# Patient Record
Sex: FEMALE | Race: WHITE | NOT HISPANIC OR LATINO | URBAN - METROPOLITAN AREA
[De-identification: names, ages, dates, MRNs, and addresses within clinical notes are randomized per-mention and may not be internally consistent; named-entity substitution may affect disease eponyms.]

---

## 2017-08-18 ENCOUNTER — OUTPATIENT (OUTPATIENT)
Dept: OUTPATIENT SERVICES | Facility: HOSPITAL | Age: 57
LOS: 1 days | Discharge: HOME | End: 2017-08-18
Payer: COMMERCIAL

## 2017-08-18 DIAGNOSIS — M06.9 RHEUMATOID ARTHRITIS, UNSPECIFIED: ICD-10-CM

## 2017-08-18 PROCEDURE — 71020: CPT | Mod: 26

## 2017-08-18 PROCEDURE — 73130 X-RAY EXAM OF HAND: CPT | Mod: 26,50

## 2017-08-18 PROCEDURE — 73630 X-RAY EXAM OF FOOT: CPT | Mod: 26,50

## 2017-09-08 ENCOUNTER — APPOINTMENT (OUTPATIENT)
Dept: RHEUMATOLOGY | Facility: CLINIC | Age: 57
End: 2017-09-08

## 2017-10-07 ENCOUNTER — OUTPATIENT (OUTPATIENT)
Dept: OUTPATIENT SERVICES | Facility: HOSPITAL | Age: 57
LOS: 1 days | Discharge: HOME | End: 2017-10-07

## 2017-10-07 DIAGNOSIS — M06.9 RHEUMATOID ARTHRITIS, UNSPECIFIED: ICD-10-CM

## 2017-10-13 ENCOUNTER — APPOINTMENT (OUTPATIENT)
Dept: OBGYN | Facility: CLINIC | Age: 57
End: 2017-10-13

## 2017-11-17 ENCOUNTER — APPOINTMENT (OUTPATIENT)
Dept: OBGYN | Facility: CLINIC | Age: 57
End: 2017-11-17
Payer: COMMERCIAL

## 2017-11-17 VITALS
BODY MASS INDEX: 20.49 KG/M2 | HEIGHT: 66.5 IN | DIASTOLIC BLOOD PRESSURE: 80 MMHG | TEMPERATURE: 97.9 F | WEIGHT: 129 LBS | SYSTOLIC BLOOD PRESSURE: 124 MMHG

## 2017-11-17 DIAGNOSIS — N95.1 MENOPAUSAL AND FEMALE CLIMACTERIC STATES: ICD-10-CM

## 2017-11-17 PROCEDURE — 99396 PREV VISIT EST AGE 40-64: CPT

## 2017-11-17 PROCEDURE — 82270 OCCULT BLOOD FECES: CPT

## 2017-11-19 RX ORDER — CELECOXIB 200 MG/1
200 CAPSULE ORAL
Qty: 60 | Refills: 0 | Status: ACTIVE | COMMUNITY
Start: 2017-01-31

## 2017-12-15 ENCOUNTER — RESULT REVIEW (OUTPATIENT)
Age: 57
End: 2017-12-15

## 2018-01-03 LAB — CYTOLOGY CVX/VAG DOC THIN PREP: NORMAL

## 2018-09-02 ENCOUNTER — EMERGENCY (EMERGENCY)
Facility: HOSPITAL | Age: 58
LOS: 1 days | Discharge: HOME | End: 2018-09-02
Attending: EMERGENCY MEDICINE
Payer: COMMERCIAL

## 2018-09-02 VITALS
SYSTOLIC BLOOD PRESSURE: 147 MMHG | TEMPERATURE: 98 F | RESPIRATION RATE: 18 BRPM | DIASTOLIC BLOOD PRESSURE: 97 MMHG | WEIGHT: 125 LBS | HEIGHT: 66 IN | OXYGEN SATURATION: 98 % | HEART RATE: 87 BPM

## 2018-09-02 PROCEDURE — 99283 EMERGENCY DEPT VISIT LOW MDM: CPT

## 2018-09-02 PROCEDURE — 73502 X-RAY EXAM HIP UNI 2-3 VIEWS: CPT | Mod: 26,RT

## 2018-09-02 PROCEDURE — 73502 X-RAY EXAM HIP UNI 2-3 VIEWS: CPT

## 2018-09-02 PROCEDURE — 73564 X-RAY EXAM KNEE 4 OR MORE: CPT | Mod: 26,RT

## 2018-09-02 PROCEDURE — 99284 EMERGENCY DEPT VISIT MOD MDM: CPT

## 2018-09-02 PROCEDURE — 73564 X-RAY EXAM KNEE 4 OR MORE: CPT

## 2018-09-02 RX ORDER — IBUPROFEN 200 MG
600 TABLET ORAL ONCE
Qty: 0 | Refills: 0 | Status: DISCONTINUED | OUTPATIENT
Start: 2018-09-02 | End: 2018-09-07
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
## 2018-09-02 NOTE — ED STATDOCS - PHYSICAL EXAMINATION
RLE: no apparent bony or muscular deformity. no apparent swelling. 2+ dp/pt pulses. sensation intact./ no calf tenderness. + TTP over lateral and medial aspects of the patella. no crepitus. + Flexion and extension of the knee. +TTP over the posterior thigh. no muscular defomity palpable. + Flexion and extension of the hip leg length equal. no crepitus at the hip
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
## 2018-09-02 NOTE — ED STATDOCS - ATTENDING CONTRIBUTION TO CARE
I, Leonardo Arevalo, performed bedside interview with this patient regarding history of present illness, review of symptoms and relevant past medical, social and family history.  I completed an independent physical examination.  I have communicated the patient’s plan of care and disposition with the ACP.

## 2019-01-11 ENCOUNTER — APPOINTMENT (OUTPATIENT)
Dept: OBGYN | Facility: CLINIC | Age: 59
End: 2019-01-11

## 2019-12-05 ENCOUNTER — APPOINTMENT (OUTPATIENT)
Dept: OBGYN | Facility: CLINIC | Age: 59
End: 2019-12-05

## 2019-12-16 ENCOUNTER — APPOINTMENT (OUTPATIENT)
Dept: OBGYN | Facility: CLINIC | Age: 59
End: 2019-12-16

## 2020-01-09 ENCOUNTER — EMERGENCY (EMERGENCY)
Facility: HOSPITAL | Age: 60
LOS: 0 days | Discharge: HOME | End: 2020-01-09
Attending: EMERGENCY MEDICINE
Payer: COMMERCIAL

## 2020-01-09 VITALS — WEIGHT: 134.92 LBS | HEIGHT: 67 IN

## 2020-01-09 VITALS — SYSTOLIC BLOOD PRESSURE: 167 MMHG | HEART RATE: 88 BPM | DIASTOLIC BLOOD PRESSURE: 93 MMHG
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
## 2020-01-09 DIAGNOSIS — R51 HEADACHE: ICD-10-CM
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
## 2020-01-09 DIAGNOSIS — S09.90XA UNSPECIFIED INJURY OF HEAD, INITIAL ENCOUNTER: ICD-10-CM

## 2020-01-09 DIAGNOSIS — S01.01XA LACERATION WITHOUT FOREIGN BODY OF SCALP, INITIAL ENCOUNTER: ICD-10-CM
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
## 2020-01-09 PROCEDURE — 99284 EMERGENCY DEPT VISIT MOD MDM: CPT | Mod: 25

## 2020-01-09 PROCEDURE — 72125 CT NECK SPINE W/O DYE: CPT

## 2020-01-09 PROCEDURE — 12001 RPR S/N/AX/GEN/TRNK 2.5CM/<: CPT

## 2020-01-09 PROCEDURE — 72125 CT NECK SPINE W/O DYE: CPT | Mod: 26

## 2020-01-09 PROCEDURE — 70450 CT HEAD/BRAIN W/O DYE: CPT | Mod: 26

## 2020-01-09 PROCEDURE — 99284 EMERGENCY DEPT VISIT MOD MDM: CPT

## 2020-01-09 PROCEDURE — 70450 CT HEAD/BRAIN W/O DYE: CPT

## 2020-01-09 RX ORDER — CEPHALEXIN 500 MG
1 CAPSULE ORAL
Qty: 20 | Refills: 0
Start: 2020-01-09 | End: 2020-01-13

## 2020-01-09 NOTE — ED STATDOCS - PROGRESS NOTE DETAILS
patient seen and evaluated.  well appearing.  CTs negative.  Laceration almost 24 hours old, after irrigation to the area there is an appreciable gape, 1 staple appled to better approximate.  wound care reviewed with the patient.  She will follow up pmd -Martha Vidal PA-C

## 2020-01-09 NOTE — ED STATDOCS - OBJECTIVE STATEMENT
60 y/o female presents to ED c/o headache s/o falling down the stairs last night. +Laceration to back of head, denies LOC. Pt visited an urgent care and was told to come to ED for a CT of head and neck. Pt had active bleeding from back of head after fall. Fall took place around 8:30 PM last night.

## 2020-01-09 NOTE — ED STATDOCS - SKIN, MLM
skin normal color for race, warm, dry and intact. +2 cm laceration vertical orientation left occiput, hemostatic, no foreign body.

## 2020-01-09 NOTE — ED STATDOCS - PATIENT PORTAL LINK FT
You can access the FollowMyHealth Patient Portal offered by [facility] by registering at the following website: [email or web address]. By joining [facility]’s FollowMyHealth portal, you will also be able to view your health information using other applications (apps) compatible with our system.

## 2020-01-09 NOTE — ED ADULT NURSE NOTE - CHIEF COMPLAINT QUOTE
Pt reports falling down the stairs last night and experiencing bleeding last night and headache this am.  Intake MD to eval with no neuro alert at this time.  Denies LOC, dizziness, or blood thinners.

## 2020-01-09 NOTE — ED ADULT TRIAGE NOTE - CHIEF COMPLAINT QUOTE
Pt reports falling down the stairs last night and experiencing bleeding and headache.  Intake MD to eval for neuro alert.  Denies LOC, dizziness, or blood thinners. Pt reports falling down the stairs last night and experiencing bleeding last night and headache this am.  Intake MD to eval with no neuro alert at this time.  Denies LOC, dizziness, or blood thinners.

## 2020-01-09 NOTE — ED STATDOCS - NSFOLLOWUPINSTRUCTIONS_ED_ALL_ED_FT
Staple should be removed in 7 days.  seek medical attention with signs of infection including redness, pain swelling, pus, fever    Laceration    WHAT YOU NEED TO KNOW:    A laceration is an injury to the skin and the soft tissue underneath it. Lacerations happen when you are cut or hit by something. They can happen anywhere on the body.     DISCHARGE INSTRUCTIONS:    Return to the emergency department if:     You have heavy bleeding or bleeding that does not stop after 10 minutes of holding firm, direct pressure over the wound.       Your wound opens up.     Contact your healthcare provider if:     You have a fever or chills.       Your laceration is red, warm, or swollen.      You have red streaks on your skin coming from your wound.      You have white or yellow drainage from the wound that smells bad.      You have pain that gets worse, even after treatment.       You have questions or concerns about your condition or care.     Medicines:     Prescription pain medicine may be given. Ask how to take this medicine safely.       Antibiotics help treat or prevent a bacterial infection.       Take your medicine as directed. Contact your healthcare provider if you think your medicine is not helping or if you have side effects. Tell him or her if you are allergic to any medicine. Keep a list of the medicines, vitamins, and herbs you take. Include the amounts, and when and why you take them. Bring the list or the pill bottles to follow-up visits. Carry your medicine list with you in case of an emergency.    Care for your wound as directed:     Do not get your wound wet until your healthcare provider says it is okay. Do not soak your wound in water. Do not go swimming until your healthcare provider says it is okay. Carefully wash the wound with soap and water. Gently pat the area dry or allow it to air dry.       Change your bandages when they get wet, dirty, or after washing. Apply new, clean bandages as directed. Do not apply elastic bandages or tape too tight. Do not put powders or lotions over your incision.       Apply antibiotic ointment as directed. Your healthcare provider may give you antibiotic ointment to put over your wound if you have stitches. If you have strips of tape over your incision, let them dry up and fall off on their own. If they do not fall off within 14 days, gently remove them. If you have glue over your wound, do not remove or pick at it. If your glue comes off, do not replace it with glue that you have at home.       Check your wound every day for signs of infection such as swelling, redness, or pus.     Self-care:     Apply ice on your wound for 15 to 20 minutes every hour or as directed. Use an ice pack, or put crushed ice in a plastic bag. Cover it with a towel. Ice helps prevent tissue damage and decreases swelling and pain.      Use a splint as directed. A splint will decrease movement and stress on your wound. It may help it heal faster. A splint may be used for lacerations over joints or areas of your body that bend. Ask your healthcare provider how to apply and remove a splint.       Decrease scarring of your wound by applying ointments as directed. Do not apply ointments until your healthcare provider says it is okay. You may need to wait until your wound is healed. Ask which ointment to buy and how often to use it. After your wound is healed, use sunscreen over the area when you are out in the sun. You should do this for at least 6 months to 1 year after your injury.     Follow up with your healthcare provider as directed: You may need to follow up in 24 to 48 hours to have your wound checked for infection. You will need to return in 3 to 14 days if you have stitches or staples so they can be removed. Care for your wound as directed to prevent infection and help it heal. Write down your questions so you remember to ask them during your visits.

## 2020-01-18 ENCOUNTER — EMERGENCY (EMERGENCY)
Facility: HOSPITAL | Age: 60
LOS: 0 days | Discharge: HOME | End: 2020-01-18
Attending: EMERGENCY MEDICINE
Payer: COMMERCIAL

## 2020-01-18 VITALS
HEART RATE: 87 BPM | DIASTOLIC BLOOD PRESSURE: 102 MMHG | OXYGEN SATURATION: 98 % | RESPIRATION RATE: 16 BRPM | TEMPERATURE: 99 F | SYSTOLIC BLOOD PRESSURE: 167 MMHG

## 2020-01-18 VITALS — WEIGHT: 134.92 LBS | HEIGHT: 67 IN

## 2020-01-18 DIAGNOSIS — S01.01XD LACERATION WITHOUT FOREIGN BODY OF SCALP, SUBSEQUENT ENCOUNTER: ICD-10-CM

## 2020-01-18 DIAGNOSIS — I10 ESSENTIAL (PRIMARY) HYPERTENSION: ICD-10-CM
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
## 2020-01-18 PROCEDURE — G0463: CPT

## 2020-01-18 NOTE — ED STATDOCS - CLINICAL SUMMARY MEDICAL DECISION MAKING FREE TEXT BOX
59 yr old F presents to the ED for suture removal 59 yr old F presents to the ED for suture removal        Staple removed.  HTN in ED x 2 visits.  Attending Rx medications.  Yajaira Nunez PA-C 59 yr old F presents to the ED for suture removal and hypertension on 2 visits to ed.  will remove staple and start on low dose bp med, reduction in salt intake discussed and f/u with pcp asap.        Staple removed.  HTN in ED x 2 visits.  Attending Rx medications.  Yajaira Nunez PA-C

## 2020-01-18 NOTE — ED STATDOCS - ATTENDING CONTRIBUTION TO CARE
I, Inés Weir MD, personally saw the patient with ACP.  I have personally performed a face to face diagnostic evaluation on this patient.  I have reviewed the ACP note and agree with the history, exam, and plan of care, except as noted.

## 2020-01-18 NOTE — ED STATDOCS - PROGRESS NOTE DETAILS
+1 staple to superior occiput.  For removal.  Yajaira Nunez PA-C Staple removed.  HTN in ED x 2 visits.  Attending Rx medications.  Yajaira Nunez PA-C

## 2020-01-18 NOTE — ED STATDOCS - NSFOLLOWUPCLINICS_GEN_ALL_ED_FT
[facility]  Family Medicine  [address] [place] [address]  Phone: [phone]  Fax:   Follow Up Time:

## 2020-01-18 NOTE — ED STATDOCS - PATIENT PORTAL LINK FT
You can access the FollowMyHealth Patient Portal offered by [facility] by registering at the following website: [email or web address]. By joining [facility]’s FollowMyHealth portal, you will also be able to view your health information using other applications (apps) compatible with our system.

## 2020-01-18 NOTE — ED STATDOCS - OBJECTIVE STATEMENT
60 y/o F with no PMHx presenting to the ED for suture removal. Patient reports that she fell down the stairs x1 week ago when she hit the back of her head, and presents back to the ED for staple removal. Notes that she has being having some neck pain. Denies any CP, SOB, fever or chills. Patient has no further complaints at this time.

## 2020-01-18 NOTE — ED STATDOCS - NSFOLLOWUPINSTRUCTIONS_ED_ALL_ED_FT
Stitches Removal    AMBULATORY CARE:    What you need to know about stitches removal: Stitches are usually removed within 14 days, depending on the location of the wound. Your healthcare provider will tell you when to return to have your stitches removed. Your provider will use sterile forceps or tweezers to  the knot of each stitch. He or she will cut the stitch with scissors and pull the stitch out. You may feel a slight tug as the stitch comes out.    Seek care immediately if:     Your wound splits open or is starting to come apart.      You suddenly cannot move your injured joint.      You have sudden numbness around your wound.      You see red streaks coming from your wound.    Contact your healthcare provider if:     You have a fever and chills.      Your wound is red, warm, swollen, or leaking pus.      There is a bad smell coming from your wound.      You have increased pain in the wound area.      You have questions or concerns about your condition or care.    Care for the area after the stitches are removed:     Do not pull medical tape off. Your provider may place small strips of medical tape across your wound after the stitches have been removed. These strips will peel and fall of on their own. Do not pull them off.      Clean the area as directed. Carefully wash the area with soap and water. Pat the area dry with a clean towel. Check the area for signs of infection, such as redness, swelling, or pus. Also check that the wound is not coming apart.      Protect your wound. Your wound can swell, bleed, or split open if it is stretched or bumped. You may need to wear a bandage that supports your wound until it is completely healed.      Care for a scar. You may have a scar after the stitches are removed. Use sunblock if the area is exposed to the sun. Apply it every day after the stitches are removed. This will help prevent skin discoloration. Talk to your healthcare provider about medicines you can use to make the scar less visible. Some medicines are available without a prescription.    Follow up with your healthcare provider as directed: You may need to return in 3 to 5 days if the stitches are on your face. Stitches on your scalp need to be removed after 7 to 14 days. Stitches over joints may remain in place up to 14 days. Write down your questions so you remember to ask them during your visits.    Hypertension    WHAT YOU NEED TO KNOW:    Hypertension is high blood pressure. Your blood pressure is the force of your blood moving against the walls of your arteries. Hypertension causes your blood pressure to get so high that your heart has to work much harder than normal. This can damage your heart. The cause of hypertension may not be known. This is called essential or primary hypertension. Hypertension caused by another medical condition, such as kidney disease, is called secondary hypertension.    DISCHARGE INSTRUCTIONS:    Call 911 for any of the following:     You have chest pain.      You have any of the following signs of a heart attack:   Squeezing, pressure, or pain in your chest       and any of the following:   Discomfort or pain in your back, neck, jaw, stomach, or arm       Shortness of breath      Nausea or vomiting      Lightheadedness or a sudden cold sweat      You become confused or have difficulty speaking.      You suddenly feel lightheaded or have trouble breathing.    Return to the emergency department if:     You have a severe headache or vision loss.      You have weakness in an arm or leg.     Contact your healthcare provider if:     You feel faint, dizzy, confused, or drowsy.       You have been taking your blood pressure medicine but your pressure is higher than your provider says it should be.      You have questions or concerns about your condition or care.    Medicines: You may need any of the following:     Antihypertensives may be used to help lower your blood pressure. Several kinds of medicines are available. Your healthcare provider will choose medicines based on the kind of hypertension you have. You may need more than one type of medicine. Take the medicine exactly as directed.       Diuretics help decrease extra fluid that collects in your body. This will help lower your blood pressure. You may urinate more often while you take this medicine.      Cholesterol medicine helps lower your cholesterol level. A low cholesterol level helps prevent heart disease and makes it easier to control your blood pressure.       Take your medicine as directed. Contact your healthcare provider if you think your medicine is not helping or if you have side effects. Tell him or her if you are allergic to any medicine. Keep a list of the medicines, vitamins, and herbs you take. Include the amounts, and when and why you take them. Bring the list or the pill bottles to follow-up visits. Carry your medicine list with you in case of an emergency.    Follow up with your healthcare provider as directed: You will need to return to have your blood pressure checked and to have other lab tests done. Write down your questions so you remember to ask them during your visits.     Stages of hypertension: Blood Pressure Readings         Normal blood pressure is 119/79 or lower. Your healthcare provider may only check your blood pressure each year if it stays at a normal level.      Elevated blood pressure is 120/79 to 129/79. This is sometimes called prehypertension. Your healthcare provider may suggest lifestyle changes to help lower your blood pressure to a normal level. He or she may then check it again in 3 to 6 months.      Stage 1 hypertension is 130/80 to 139/89. Your provider may recommend lifestyle changes, medication, and checks every 3 to 6 months until your blood pressure is controlled.      Stage 2 hypertension is 140/90 or higher. Your provider will recommend lifestyle changes and have you take 2 kinds of hypertension medicines. You will also need to have your blood pressure checked monthly until it is controlled.    Manage hypertension:     Check your blood pressure at home. Avoid smoking, caffeine, and exercise at least 30 minutes before checking your blood pressure. Sit and rest for 5 minutes before you take your blood pressure. Extend your arm and support it on a flat surface. Your arm should be at the same level as your heart. Follow the directions that came with your blood pressure monitor. Check your blood pressure 2 times, 1 minute apart, before you take your medicine in the morning. Also check your blood pressure before your evening meal. Keep a record of your readings and bring it to your follow-up visits. Ask your healthcare provider what your blood pressure should be. How to take a Blood Pressure           Manage any other health conditions you have. Health conditions such as diabetes can increase your risk for hypertension. Follow your healthcare provider's instructions and take all your medicines as directed.       Ask about all medicines. Certain medicines can increase your blood pressure. Examples include oral birth control pills, decongestants, herbal supplements, and NSAIDs, such as ibuprofen. Your healthcare provider can tell you which medicines are safe for you to take. This includes prescription and over-the-counter medicines.    Lifestyle changes you can make to manage hypertension:     Limit sodium (salt) as directed. Too much sodium can affect your fluid balance. Check labels to find low-sodium or no-salt-added foods. Some low-sodium foods use potassium salts for flavor. Too much potassium can also cause health problems. Your healthcare provider will tell you how much sodium and potassium are safe for you to have in a day. He or she may recommend that you limit sodium to 2,300 mg a day.           Follow the meal plan recommended by your healthcare provider. A dietitian or your provider can give you more information on low-sodium plans or the DASH (Dietary Approaches to Stop Hypertension) eating plan. The DASH plan is low in sodium, unhealthy fats, and total fat. It is high in potassium, calcium, and fiber.            Exercise to maintain a healthy weight. Exercise at least 30 minutes per day, on most days of the week. This will help decrease your blood pressure. Ask your healthcare provider about the best exercise plan for you. Walking for Exercise           Decrease stress. This may help lower your blood pressure. Learn ways to relax, such as deep breathing or listening to music.      Limit alcohol as directed. Alcohol can increase your blood pressure. A drink of alcohol is 12 ounces of beer, 5 ounces of wine, or 1½ ounces of liquor.      Do not smoke. Nicotine and other chemicals in cigarettes and cigars can increase your blood pressure and also cause lung damage. Ask your healthcare provider for information if you currently smoke and need help to quit. E-cigarettes or smokeless tobacco still contain nicotine. Talk to your healthcare provider before you use these products. FOLLOW UP WITH YOUR PCP OR [facility] WITHIN 1 WEEK    Stitches Removal    AMBULATORY CARE:    What you need to know about stitches removal: Stitches are usually removed within 14 days, depending on the location of the wound. Your healthcare provider will tell you when to return to have your stitches removed. Your provider will use sterile forceps or tweezers to  the knot of each stitch. He or she will cut the stitch with scissors and pull the stitch out. You may feel a slight tug as the stitch comes out.    Seek care immediately if:     Your wound splits open or is starting to come apart.      You suddenly cannot move your injured joint.      You have sudden numbness around your wound.      You see red streaks coming from your wound.    Contact your healthcare provider if:     You have a fever and chills.      Your wound is red, warm, swollen, or leaking pus.      There is a bad smell coming from your wound.      You have increased pain in the wound area.      You have questions or concerns about your condition or care.    Care for the area after the stitches are removed:     Do not pull medical tape off. Your provider may place small strips of medical tape across your wound after the stitches have been removed. These strips will peel and fall of on their own. Do not pull them off.      Clean the area as directed. Carefully wash the area with soap and water. Pat the area dry with a clean towel. Check the area for signs of infection, such as redness, swelling, or pus. Also check that the wound is not coming apart.      Protect your wound. Your wound can swell, bleed, or split open if it is stretched or bumped. You may need to wear a bandage that supports your wound until it is completely healed.      Care for a scar. You may have a scar after the stitches are removed. Use sunblock if the area is exposed to the sun. Apply it every day after the stitches are removed. This will help prevent skin discoloration. Talk to your healthcare provider about medicines you can use to make the scar less visible. Some medicines are available without a prescription.    Follow up with your healthcare provider as directed: You may need to return in 3 to 5 days if the stitches are on your face. Stitches on your scalp need to be removed after 7 to 14 days. Stitches over joints may remain in place up to 14 days. Write down your questions so you remember to ask them during your visits.    Hypertension    WHAT YOU NEED TO KNOW:    Hypertension is high blood pressure. Your blood pressure is the force of your blood moving against the walls of your arteries. Hypertension causes your blood pressure to get so high that your heart has to work much harder than normal. This can damage your heart. The cause of hypertension may not be known. This is called essential or primary hypertension. Hypertension caused by another medical condition, such as kidney disease, is called secondary hypertension.    DISCHARGE INSTRUCTIONS:    Call 911 for any of the following:     You have chest pain.      You have any of the following signs of a heart attack:   Squeezing, pressure, or pain in your chest       and any of the following:   Discomfort or pain in your back, neck, jaw, stomach, or arm       Shortness of breath      Nausea or vomiting      Lightheadedness or a sudden cold sweat      You become confused or have difficulty speaking.      You suddenly feel lightheaded or have trouble breathing.    Return to the emergency department if:     You have a severe headache or vision loss.      You have weakness in an arm or leg.     Contact your healthcare provider if:     You feel faint, dizzy, confused, or drowsy.       You have been taking your blood pressure medicine but your pressure is higher than your provider says it should be.      You have questions or concerns about your condition or care.    Medicines: You may need any of the following:     Antihypertensives may be used to help lower your blood pressure. Several kinds of medicines are available. Your healthcare provider will choose medicines based on the kind of hypertension you have. You may need more than one type of medicine. Take the medicine exactly as directed.       Diuretics help decrease extra fluid that collects in your body. This will help lower your blood pressure. You may urinate more often while you take this medicine.      Cholesterol medicine helps lower your cholesterol level. A low cholesterol level helps prevent heart disease and makes it easier to control your blood pressure.       Take your medicine as directed. Contact your healthcare provider if you think your medicine is not helping or if you have side effects. Tell him or her if you are allergic to any medicine. Keep a list of the medicines, vitamins, and herbs you take. Include the amounts, and when and why you take them. Bring the list or the pill bottles to follow-up visits. Carry your medicine list with you in case of an emergency.    Follow up with your healthcare provider as directed: You will need to return to have your blood pressure checked and to have other lab tests done. Write down your questions so you remember to ask them during your visits.     Stages of hypertension: Blood Pressure Readings         Normal blood pressure is 119/79 or lower. Your healthcare provider may only check your blood pressure each year if it stays at a normal level.      Elevated blood pressure is 120/79 to 129/79. This is sometimes called prehypertension. Your healthcare provider may suggest lifestyle changes to help lower your blood pressure to a normal level. He or she may then check it again in 3 to 6 months.      Stage 1 hypertension is 130/80 to 139/89. Your provider may recommend lifestyle changes, medication, and checks every 3 to 6 months until your blood pressure is controlled.      Stage 2 hypertension is 140/90 or higher. Your provider will recommend lifestyle changes and have you take 2 kinds of hypertension medicines. You will also need to have your blood pressure checked monthly until it is controlled.    Manage hypertension:     Check your blood pressure at home. Avoid smoking, caffeine, and exercise at least 30 minutes before checking your blood pressure. Sit and rest for 5 minutes before you take your blood pressure. Extend your arm and support it on a flat surface. Your arm should be at the same level as your heart. Follow the directions that came with your blood pressure monitor. Check your blood pressure 2 times, 1 minute apart, before you take your medicine in the morning. Also check your blood pressure before your evening meal. Keep a record of your readings and bring it to your follow-up visits. Ask your healthcare provider what your blood pressure should be. How to take a Blood Pressure           Manage any other health conditions you have. Health conditions such as diabetes can increase your risk for hypertension. Follow your healthcare provider's instructions and take all your medicines as directed.       Ask about all medicines. Certain medicines can increase your blood pressure. Examples include oral birth control pills, decongestants, herbal supplements, and NSAIDs, such as ibuprofen. Your healthcare provider can tell you which medicines are safe for you to take. This includes prescription and over-the-counter medicines.    Lifestyle changes you can make to manage hypertension:     Limit sodium (salt) as directed. Too much sodium can affect your fluid balance. Check labels to find low-sodium or no-salt-added foods. Some low-sodium foods use potassium salts for flavor. Too much potassium can also cause health problems. Your healthcare provider will tell you how much sodium and potassium are safe for you to have in a day. He or she may recommend that you limit sodium to 2,300 mg a day.           Follow the meal plan recommended by your healthcare provider. A dietitian or your provider can give you more information on low-sodium plans or the DASH (Dietary Approaches to Stop Hypertension) eating plan. The DASH plan is low in sodium, unhealthy fats, and total fat. It is high in potassium, calcium, and fiber.            Exercise to maintain a healthy weight. Exercise at least 30 minutes per day, on most days of the week. This will help decrease your blood pressure. Ask your healthcare provider about the best exercise plan for you. Walking for Exercise           Decrease stress. This may help lower your blood pressure. Learn ways to relax, such as deep breathing or listening to music.      Limit alcohol as directed. Alcohol can increase your blood pressure. A drink of alcohol is 12 ounces of beer, 5 ounces of wine, or 1½ ounces of liquor.      Do not smoke. Nicotine and other chemicals in cigarettes and cigars can increase your blood pressure and also cause lung damage. Ask your healthcare provider for information if you currently smoke and need help to quit. E-cigarettes or smokeless tobacco still contain nicotine. Talk to your healthcare provider before you use these products.

## 2020-01-19 PROBLEM — Z78.9 OTHER SPECIFIED HEALTH STATUS: Chronic | Status: ACTIVE | Noted: 2020-01-10

## 2020-03-17 ENCOUNTER — APPOINTMENT (OUTPATIENT)
Dept: OBGYN | Facility: CLINIC | Age: 60
End: 2020-03-17

## 2021-08-21 ENCOUNTER — APPOINTMENT (OUTPATIENT)
Dept: OBGYN | Facility: CLINIC | Age: 61
End: 2021-08-21

## 2022-10-20 ENCOUNTER — APPOINTMENT (OUTPATIENT)
Dept: OBGYN | Facility: CLINIC | Age: 62
End: 2022-10-20

## 2022-10-20 VITALS
DIASTOLIC BLOOD PRESSURE: 68 MMHG | SYSTOLIC BLOOD PRESSURE: 116 MMHG | BODY MASS INDEX: 23.35 KG/M2 | HEIGHT: 66.5 IN | WEIGHT: 147 LBS

## 2022-10-20 DIAGNOSIS — Z01.419 ENCOUNTER FOR GYNECOLOGICAL EXAMINATION (GENERAL) (ROUTINE) W/OUT ABNORMAL FINDINGS: ICD-10-CM

## 2022-10-20 LAB
BILIRUB UR QL STRIP: NORMAL
CLARITY UR: CLEAR
COLLECTION METHOD: NORMAL
GLUCOSE UR-MCNC: NORMAL
HCG UR QL: 0.2 EU/DL
HGB UR QL STRIP.AUTO: NORMAL
KETONES UR-MCNC: NORMAL
LEUKOCYTE ESTERASE UR QL STRIP: NORMAL
NITRITE UR QL STRIP: POSITIVE
PH UR STRIP: 8.5
PROT UR STRIP-MCNC: NORMAL
SP GR UR STRIP: 1.01

## 2022-10-20 PROCEDURE — 81003 URINALYSIS AUTO W/O SCOPE: CPT | Mod: QW

## 2022-10-20 PROCEDURE — 82270 OCCULT BLOOD FECES: CPT

## 2022-10-20 PROCEDURE — 99396 PREV VISIT EST AGE 40-64: CPT

## 2022-10-20 RX ORDER — PREGABALIN 150 MG/1
150 CAPSULE ORAL
Qty: 60 | Refills: 0 | Status: ACTIVE | COMMUNITY
Start: 2022-05-19

## 2022-10-20 RX ORDER — LIFITEGRAST 50 MG/ML
5 SOLUTION/ DROPS OPHTHALMIC
Qty: 60 | Refills: 0 | Status: ACTIVE | COMMUNITY
Start: 2022-04-14

## 2022-10-20 RX ORDER — IPRATROPIUM BROMIDE 42 UG/1
0.06 SPRAY NASAL
Qty: 15 | Refills: 0 | Status: ACTIVE | COMMUNITY
Start: 2022-09-14

## 2022-10-20 RX ORDER — TIZANIDINE 4 MG/1
4 TABLET ORAL
Qty: 90 | Refills: 0 | Status: ACTIVE | COMMUNITY
Start: 2022-02-17

## 2022-10-20 RX ORDER — BUDESONIDE 3 MG/1
3 CAPSULE, COATED PELLETS ORAL
Qty: 60 | Refills: 0 | Status: ACTIVE | COMMUNITY
Start: 2022-10-13

## 2022-10-20 RX ORDER — FLUNISOLIDE 0.25 MG/ML
25 MCG/ACT SOLUTION NASAL
Qty: 25 | Refills: 0 | Status: ACTIVE | COMMUNITY
Start: 2022-09-14

## 2022-10-20 RX ORDER — COLESTIPOL HYDROCHLORIDE 1 G/1
1 TABLET, FILM COATED ORAL
Qty: 360 | Refills: 0 | Status: ACTIVE | COMMUNITY
Start: 2022-04-13

## 2022-10-20 RX ORDER — BRIMONIDINE TARTRATE 5 MG/G
0.33 GEL TOPICAL
Qty: 90 | Refills: 0 | Status: ACTIVE | COMMUNITY
Start: 2022-04-22

## 2022-10-20 RX ORDER — BUDESONIDE 9 MG/1
9 TABLET, EXTENDED RELEASE ORAL
Qty: 30 | Refills: 0 | Status: ACTIVE | COMMUNITY
Start: 2022-03-10

## 2022-10-20 RX ORDER — GOLIMUMAB 50 MG/.5ML
50 INJECTION, SOLUTION SUBCUTANEOUS
Qty: 1 | Refills: 0 | Status: ACTIVE | COMMUNITY
Start: 2022-04-04

## 2022-10-20 RX ORDER — DICYCLOMINE HYDROCHLORIDE 20 MG/1
20 TABLET ORAL
Qty: 270 | Refills: 0 | Status: ACTIVE | COMMUNITY
Start: 2022-04-13

## 2022-10-20 RX ORDER — TOBRAMYCIN AND DEXAMETHASONE 3; 1 MG/ML; MG/ML
0.3-0.1 SUSPENSION/ DROPS OPHTHALMIC
Qty: 10 | Refills: 0 | Status: DISCONTINUED | COMMUNITY
Start: 2017-06-16 | End: 2022-10-20

## 2022-10-20 NOTE — PLAN
[FreeTextEntry1] : 61 YO FEMALE PRESENTS FOR ANNUAL GYN EXAMINATION. SELF BREAST EXAMINATION TAUGHT. MAMMOGRAM ORDERED. EXERCISE, CALCIUM AND VITAMIN D3 SUPPLEMENTATION DISCUSSED. BONE DENSITY STUDY AND INDICATIONS REVIEWED. COLONOSCOPY HISTORY REVIEWED AND DISCUSSED FOLLOWUP. CT ORDERED

## 2022-11-07 LAB
CYTOLOGY CVX/VAG DOC THIN PREP: NORMAL
HPV HIGH+LOW RISK DNA PNL CVX: NOT DETECTED

## 2022-11-15 ENCOUNTER — NON-APPOINTMENT (OUTPATIENT)
Age: 62
End: 2022-11-15

## 2022-11-15 DIAGNOSIS — R30.0 DYSURIA: ICD-10-CM

## 2022-11-15 RX ORDER — METHOTREXATE 2.5 MG/1
2.5 TABLET ORAL
Qty: 24 | Refills: 0 | Status: DISCONTINUED | COMMUNITY
Start: 2017-08-23 | End: 2022-11-15

## 2023-01-10 ENCOUNTER — NON-APPOINTMENT (OUTPATIENT)
Age: 63
End: 2023-01-10

## 2023-03-10 ENCOUNTER — APPOINTMENT (OUTPATIENT)
Dept: CARDIOLOGY | Facility: CLINIC | Age: 63
End: 2023-03-10
Payer: COMMERCIAL

## 2023-03-10 ENCOUNTER — NON-APPOINTMENT (OUTPATIENT)
Age: 63
End: 2023-03-10

## 2023-03-10 VITALS
BODY MASS INDEX: 23.63 KG/M2 | DIASTOLIC BLOOD PRESSURE: 64 MMHG | WEIGHT: 147 LBS | SYSTOLIC BLOOD PRESSURE: 118 MMHG | HEIGHT: 66 IN

## 2023-03-10 DIAGNOSIS — Z83.438 FAMILY HISTORY OF OTHER DISORDER OF LIPOPROTEIN METABOLISM AND OTHER LIPIDEMIA: ICD-10-CM

## 2023-03-10 DIAGNOSIS — E88.81 METABOLIC SYNDROME: ICD-10-CM

## 2023-03-10 DIAGNOSIS — Z82.49 FAMILY HISTORY OF ISCHEMIC HEART DISEASE AND OTHER DISEASES OF THE CIRCULATORY SYSTEM: ICD-10-CM

## 2023-03-10 PROCEDURE — 99204 OFFICE O/P NEW MOD 45 MIN: CPT | Mod: 25

## 2023-03-10 PROCEDURE — 93000 ELECTROCARDIOGRAM COMPLETE: CPT

## 2023-03-10 RX ORDER — NITROFURANTOIN (MONOHYDRATE/MACROCRYSTALS) 25; 75 MG/1; MG/1
100 CAPSULE ORAL TWICE DAILY
Qty: 14 | Refills: 0 | Status: DISCONTINUED | COMMUNITY
Start: 2022-10-27 | End: 2023-03-10

## 2023-03-10 RX ORDER — SUVOREXANT 15 MG/1
15 TABLET, FILM COATED ORAL
Qty: 10 | Refills: 0 | Status: DISCONTINUED | COMMUNITY
Start: 2017-10-23 | End: 2023-03-10

## 2023-03-10 RX ORDER — METFORMIN ER 500 MG 500 MG/1
500 TABLET ORAL
Qty: 270 | Refills: 0 | Status: DISCONTINUED | COMMUNITY
Start: 2022-05-16 | End: 2023-03-10

## 2023-03-10 RX ORDER — SULFAMETHOXAZOLE AND TRIMETHOPRIM 800; 160 MG/1; MG/1
800-160 TABLET ORAL TWICE DAILY
Qty: 6 | Refills: 0 | Status: DISCONTINUED | COMMUNITY
Start: 2022-11-15 | End: 2023-03-10

## 2023-03-10 RX ORDER — FOLIC ACID 1 MG/1
1 TABLET ORAL
Qty: 30 | Refills: 0 | Status: DISCONTINUED | COMMUNITY
Start: 2017-08-23 | End: 2023-03-10

## 2023-03-10 RX ORDER — METHYLPREDNISOLONE 4 MG/1
4 TABLET ORAL
Qty: 21 | Refills: 0 | Status: DISCONTINUED | COMMUNITY
Start: 2017-07-13 | End: 2023-03-10

## 2023-03-10 RX ORDER — CIPROFLOXACIN HYDROCHLORIDE 250 MG/1
250 TABLET, FILM COATED ORAL
Qty: 10 | Refills: 3 | Status: DISCONTINUED | COMMUNITY
Start: 2022-10-20 | End: 2023-03-10

## 2023-03-10 RX ORDER — AMLODIPINE BESYLATE 5 MG/1
5 TABLET ORAL DAILY
Qty: 90 | Refills: 3 | Status: ACTIVE | COMMUNITY
Start: 2023-03-10

## 2023-03-10 NOTE — PHYSICAL EXAM
[Well Developed] : well developed [Well Nourished] : well nourished [No Acute Distress] : no acute distress [Normal Conjunctiva] : normal conjunctiva [Normal Venous Pressure] : normal venous pressure [No Carotid Bruit] : no carotid bruit [Normal S1, S2] : normal S1, S2 [No Murmur] : no murmur [No Gallop] : no gallop [Clear Lung Fields] : clear lung fields [Good Air Entry] : good air entry [No Respiratory Distress] : no respiratory distress

## 2023-03-14 LAB
ALBUMIN SERPL ELPH-MCNC: 5 G/DL
ALP BLD-CCNC: 50 U/L
ALT SERPL-CCNC: 17 U/L
ANION GAP SERPL CALC-SCNC: 14 MMOL/L
AST SERPL-CCNC: 21 U/L
BILIRUB SERPL-MCNC: 0.3 MG/DL
BUN SERPL-MCNC: 16 MG/DL
CALCIUM SERPL-MCNC: 10.3 MG/DL
CHLORIDE SERPL-SCNC: 94 MMOL/L
CHOLEST SERPL-MCNC: 256 MG/DL
CO2 SERPL-SCNC: 28 MMOL/L
CREAT SERPL-MCNC: 0.78 MG/DL
EGFR: 86 ML/MIN/1.73M2
ESTIMATED AVERAGE GLUCOSE: 108 MG/DL
GLUCOSE SERPL-MCNC: 87 MG/DL
HBA1C MFR BLD HPLC: 5.4 %
HDLC SERPL-MCNC: 102 MG/DL
LDLC SERPL CALC-MCNC: 139 MG/DL
NONHDLC SERPL-MCNC: 153 MG/DL
POTASSIUM SERPL-SCNC: 3.7 MMOL/L
PROT SERPL-MCNC: 6.8 G/DL
SODIUM SERPL-SCNC: 137 MMOL/L
TRIGL SERPL-MCNC: 73 MG/DL

## 2023-03-17 NOTE — REVIEW OF SYSTEMS
[Weight Gain (___ Lbs)] : [unfilled] ~Ulb weight gain [Sinus Pressure] : sinus pressure [Abdominal Pain] : abdominal pain [Rash] : rash [Easy Bruising] : a tendency for easy bruising [Negative] : Psychiatric [FreeTextEntry3] : wears contacts [FreeTextEntry5] : feels improved with better BP control and heart racing resolved [FreeTextEntry7] : colitis under control [FreeTextEntry8] : post menopausal  [FreeTextEntry9] : can get left hand and joint swelling and right big toe joint flares [de-identified] : monthly facial rash and constant itching

## 2023-03-17 NOTE — HISTORY OF PRESENT ILLNESS
[FreeTextEntry1] : 62 year old woman with a history of autoimmune disorders with relapsing polychondritis and fibromyalgia, hypertension for years and a family history of hypertension and hyperlipidemia.  She had an episode of significant diarrhea and was diagnosed with lymphocytic colitis.  She is maintained on Simponi for her collagen vascular disorder and her most recent inflammatory markers are controlled.  Her primary care physician noted that her lipids which had always had elevated HDLs but her LDLs are rising.  She is concerned about her overall cardiovascular risk.  She had Covid a year ago when she developed chronic nasal congestion and is maintained on inhalers with improvement.

## 2023-03-17 NOTE — ADDENDUM
[FreeTextEntry1] : ADDENDUM 3/17/2023:  Labs reviewed with Corey - no diabetes and ASCVD risk 3.45% so continued lifestyle measures for cardiac risk reduction recommended.  She will follow up annually.  Await stress test results.

## 2023-03-28 ENCOUNTER — APPOINTMENT (OUTPATIENT)
Dept: CARDIOLOGY | Facility: CLINIC | Age: 63
End: 2023-03-28

## 2023-07-14 ENCOUNTER — NON-APPOINTMENT (OUTPATIENT)
Age: 63
End: 2023-07-14

## 2023-07-14 DIAGNOSIS — R60.0 LOCALIZED EDEMA: ICD-10-CM

## 2023-07-16 LAB
ALBUMIN SERPL ELPH-MCNC: 4.5 G/DL
ALP BLD-CCNC: 85 U/L
ALT SERPL-CCNC: 27 U/L
ANION GAP SERPL CALC-SCNC: 14 MMOL/L
AST SERPL-CCNC: 22 U/L
BILIRUB SERPL-MCNC: 0.2 MG/DL
BUN SERPL-MCNC: 17 MG/DL
CALCIUM SERPL-MCNC: 10.1 MG/DL
CHLORIDE SERPL-SCNC: 98 MMOL/L
CHOLEST SERPL-MCNC: 223 MG/DL
CO2 SERPL-SCNC: 28 MMOL/L
CREAT SERPL-MCNC: 0.8 MG/DL
DEPRECATED D DIMER PPP IA-ACNC: <150 NG/ML DDU
EGFR: 84 ML/MIN/1.73M2
GLUCOSE SERPL-MCNC: 87 MG/DL
HDLC SERPL-MCNC: 87 MG/DL
LDLC SERPL CALC-MCNC: 106 MG/DL
NONHDLC SERPL-MCNC: 137 MG/DL
NT-PROBNP SERPL-MCNC: 37 PG/ML
POTASSIUM SERPL-SCNC: 4 MMOL/L
PROT SERPL-MCNC: 6.5 G/DL
SODIUM SERPL-SCNC: 141 MMOL/L
TRIGL SERPL-MCNC: 184 MG/DL
TSH SERPL-ACNC: 1.11 UIU/ML

## 2023-08-29 ENCOUNTER — APPOINTMENT (OUTPATIENT)
Dept: NEUROLOGY | Facility: CLINIC | Age: 63
End: 2023-08-29
Payer: COMMERCIAL

## 2023-08-29 VITALS
BODY MASS INDEX: 23.63 KG/M2 | HEART RATE: 99 BPM | HEIGHT: 66 IN | WEIGHT: 147 LBS | DIASTOLIC BLOOD PRESSURE: 97 MMHG | SYSTOLIC BLOOD PRESSURE: 152 MMHG

## 2023-08-29 DIAGNOSIS — Z00.00 ENCOUNTER FOR GENERAL ADULT MEDICAL EXAMINATION W/OUT ABNORMAL FINDINGS: ICD-10-CM

## 2023-08-29 DIAGNOSIS — F41.9 ANXIETY DISORDER, UNSPECIFIED: ICD-10-CM

## 2023-08-29 DIAGNOSIS — R47.89 OTHER SPEECH DISTURBANCES: ICD-10-CM

## 2023-08-29 DIAGNOSIS — R68.89 OTHER GENERAL SYMPTOMS AND SIGNS: ICD-10-CM

## 2023-08-29 DIAGNOSIS — F51.04 PSYCHOPHYSIOLOGIC INSOMNIA: ICD-10-CM

## 2023-08-29 PROCEDURE — 99205 OFFICE O/P NEW HI 60 MIN: CPT

## 2023-08-29 RX ORDER — CHLORHEXIDINE GLUCONATE 4 %
5 LIQUID (ML) TOPICAL TWICE DAILY
Qty: 60 | Refills: 3 | Status: ACTIVE | COMMUNITY
Start: 2023-08-29 | End: 1900-01-01

## 2023-08-29 NOTE — PHYSICAL EXAM
[General Appearance - Alert] : alert [General Appearance - In No Acute Distress] : in no acute distress [Oriented To Time, Place, And Person] : oriented to person, place, and time [Impaired Insight] : insight and judgment were intact [Affect] : the affect was normal [Person] : oriented to person [Place] : oriented to place [Time] : oriented to time [Short Term Intact] : short term memory intact [Remote Intact] : remote memory intact [Registration Intact] : recent registration memory intact [Concentration Intact] : normal concentrating ability [Visual Intact] : visual attention was ~T not ~L decreased [Naming Objects] : no difficulty naming common objects [Repeating Phrases] : no difficulty repeating a phrase [Writing A Sentence] : no difficulty writing a sentence [Fluency] : fluency intact [Comprehension] : comprehension intact [Reading] : reading intact [Current Events] : adequate knowledge of current events [Past History] : adequate knowledge of personal past history [Vocabulary] : adequate range of vocabulary [Total Score ___ / 30] : the patient achieved a score of [unfilled] /30 [Date / Time ___ / 5] : date / time [unfilled] / 5 [Place ___ / 5] : place [unfilled] / 5 [Registration ___ / 3] : registration [unfilled] / 3 [Serial Sevens ___/5] : serial sevens [unfilled] / 5 [Naming 2 Objects ___ / 2] : naming two objects [unfilled] / 2 [Repeating a Sentence ___ / 1] : repeating a sentence [unfilled] / 1 [Writing a Sentence ___ / 1] : write sentence [unfilled] / 1 [3-stage Verbal Command ___ / 3] : three-stage verbal command [unfilled] / 3 [Written Command ___ / 1] : written command [unfilled] / 1 [Copy a Design ___ / 1] : copy a design [unfilled] / 1 [Recall ___ / 3] : recall [unfilled] / 3 [Cranial Nerves Optic (II)] : visual acuity intact bilaterally,  visual fields full to confrontation, pupils equal round and reactive to light [Cranial Nerves Oculomotor (III)] : extraocular motion intact [Cranial Nerves Trigeminal (V)] : facial sensation intact symmetrically [Cranial Nerves Facial (VII)] : face symmetrical [Cranial Nerves Vestibulocochlear (VIII)] : hearing was intact bilaterally [Cranial Nerves Glossopharyngeal (IX)] : tongue and palate midline [Cranial Nerves Accessory (XI - Cranial And Spinal)] : head turning and shoulder shrug symmetric [Cranial Nerves Hypoglossal (XII)] : there was no tongue deviation with protrusion [Motor Tone] : muscle tone was normal in all four extremities [Motor Strength] : muscle strength was normal in all four extremities [Involuntary Movements] : no involuntary movements were seen [No Muscle Atrophy] : normal bulk in all four extremities [Motor Handedness Left-Handed] : the patient is left hand dominant [Sensation Tactile Decrease] : light touch was intact [Balance] : balance was intact [Past-pointing] : there was no past-pointing [Tremor] : no tremor present [2+] : Brachioradialis left 2+ [1+] : Ankle jerk left 1+ [Plantar Reflex Right Only] : normal on the right [Plantar Reflex Left Only] : normal on the left [FreeTextEntry4] : Mental Status Exam Presidents: 5/5 Alternating Pattern: ok Spiral: ok Clock: 3/3 Repetition: ok  Trail A: 38"; B: 41" Fluency: A: 16; Animals: 17 Go-No-Go: ok R/L discrimination on self and examiner: Cross-line commands:  ok Praxis:  -Motor: ok -Dynamic/Luria: ok -Ideomotor/Imitation: ok  -Ideational/writing/closing-in: ok -Dressing: ok. [Sclera] : the sclera and conjunctiva were normal [PERRL With Normal Accommodation] : pupils were equal in size, round, reactive to light, with normal accommodation [Extraocular Movements] : extraocular movements were intact [No APD] : no afferent pupillary defect [No PEYTON] : no internuclear ophthalmoplegia [Full Visual Field] : full visual field [Outer Ear] : the ears and nose were normal in appearance [Neck Appearance] : the appearance of the neck was normal [Edema] : there was no peripheral edema [Abnormal Walk] : normal gait [] : no rash

## 2023-08-29 NOTE — ASSESSMENT
[FreeTextEntry1] : Assessment: 61yo LH WW with concerns of forgetfulness and WFD over the last 2 years. RA and FM, well controlled. Stress and insomnia are a factor. Exam benign.  Diagnostic Impression: -forgetfulness, WFD -stress -anxiety -insomnia  Plan: Rule out reversible and vascular causes: -B vitamins, TFT, RPR -MRI brain w/o indira -BRIEF NPT -basic inflammatory labs -Lyme serology -Try Melatonin 5+5mg.

## 2023-08-29 NOTE — HISTORY OF PRESENT ILLNESS
[FreeTextEntry1] : COVID 2022, severe, not treated. COVID VACCINE FULL.  HPI: 61yo LH WW with RA, IBS, FM, LBP/disc bulging, here for concerns about memory and speech changes.  PMH: over the last 2 years, she has noticed, corroborated by family, changes in STM and speech.  -Memory: difficulty with recent events, conversations, appts -Speech: WFD, difficulty getting thoughts out -Orientation: some spatial difficulties -Praxis: mostly ok, some tech issues -Decision making/Executive fx/Multitasking: less organized  -Sleep: frequent awakenings, overall sleeps less than optimal, no napping; some rumination  -Appetite: has good appetite, on meatless diet now, works with nutritionist  -Motor symptoms: tends to trip/fall, no major issues; some knee issues; RA well managed, on Simponi for 1y or so, feels also better since plant-based diet  -B/B: ok  -Psychiatric symptoms: significant stress in her life-some consequent anxiety and preoccupation, difficult to feel relaxed  -Functional status: Keene Index of Phil Campbell in Activities of Daily Livin. Bathing/Showerin 2. Dressin 3. Toiletin 4. Transferrin 5. Continence: 1 6: Feedin  TOTAL: 6  Ivette-Cecilio Instrumental Activities of Daily Living: A. Ability to Use Telephone: 1 B. Shoppin C. Food Preparation: 1 D. Housekeepin E. Laundry: 1 F. Transportation: 1 G. Responsibility for Own Medications: 1 H: Ability to Handle Finances: 1  TOTAL: 8  CDR: n.a.  -Professional status: , mortgages  PCP and other physicians: -PCP: Verenice -Rheumatologist:  [email or web address] More,  Specialty: Rheumatology Treats: Adults Language: English Phone: [phone]  Workup done: none.

## 2024-02-11 ENCOUNTER — EMERGENCY (EMERGENCY)
Facility: HOSPITAL | Age: 64
LOS: 0 days | Discharge: HOME | End: 2024-02-11
Attending: STUDENT IN AN ORGANIZED HEALTH CARE EDUCATION/TRAINING PROGRAM
Payer: COMMERCIAL

## 2024-02-11 VITALS
RESPIRATION RATE: 17 BRPM | SYSTOLIC BLOOD PRESSURE: 133 MMHG | HEART RATE: 70 BPM | DIASTOLIC BLOOD PRESSURE: 88 MMHG | TEMPERATURE: 98 F | OXYGEN SATURATION: 98 %

## 2024-02-11 VITALS — WEIGHT: 134.92 LBS | HEIGHT: 66 IN

## 2024-02-11 DIAGNOSIS — R07.89 OTHER CHEST PAIN: ICD-10-CM

## 2024-02-11 DIAGNOSIS — M06.9 RHEUMATOID ARTHRITIS, UNSPECIFIED: ICD-10-CM

## 2024-02-11 DIAGNOSIS — I10 ESSENTIAL (PRIMARY) HYPERTENSION: ICD-10-CM

## 2024-02-11 DIAGNOSIS — F41.9 ANXIETY DISORDER, UNSPECIFIED: ICD-10-CM

## 2024-02-11 LAB
ALBUMIN SERPL ELPH-MCNC: 3.9 G/DL — SIGNIFICANT CHANGE UP (ref 3.3–5)
ALP SERPL-CCNC: 59 U/L — SIGNIFICANT CHANGE UP (ref 40–120)
ALT FLD-CCNC: 43 U/L — SIGNIFICANT CHANGE UP (ref 12–78)
ANION GAP SERPL CALC-SCNC: 2 MMOL/L — LOW (ref 5–17)
APTT BLD: 27.5 SEC — SIGNIFICANT CHANGE UP (ref 24.5–35.6)
AST SERPL-CCNC: 29 U/L — SIGNIFICANT CHANGE UP (ref 15–37)
BASOPHILS # BLD AUTO: 0.02 K/UL — SIGNIFICANT CHANGE UP (ref 0–0.2)
BASOPHILS NFR BLD AUTO: 0.4 % — SIGNIFICANT CHANGE UP (ref 0–2)
BILIRUB SERPL-MCNC: 0.3 MG/DL — SIGNIFICANT CHANGE UP (ref 0.2–1.2)
BUN SERPL-MCNC: 11 MG/DL — SIGNIFICANT CHANGE UP (ref 7–23)
CALCIUM SERPL-MCNC: 8.8 MG/DL — SIGNIFICANT CHANGE UP (ref 8.5–10.1)
CHLORIDE SERPL-SCNC: 95 MMOL/L — LOW (ref 96–108)
CO2 SERPL-SCNC: 34 MMOL/L — HIGH (ref 22–31)
CREAT SERPL-MCNC: 0.61 MG/DL — SIGNIFICANT CHANGE UP (ref 0.5–1.3)
EGFR: 101 ML/MIN/1.73M2 — SIGNIFICANT CHANGE UP
EOSINOPHIL # BLD AUTO: 0.04 K/UL — SIGNIFICANT CHANGE UP (ref 0–0.5)
EOSINOPHIL NFR BLD AUTO: 0.7 % — SIGNIFICANT CHANGE UP (ref 0–6)
GLUCOSE SERPL-MCNC: 93 MG/DL — SIGNIFICANT CHANGE UP (ref 70–99)
HCT VFR BLD CALC: 39 % — SIGNIFICANT CHANGE UP (ref 34.5–45)
HGB BLD-MCNC: 13.7 G/DL — SIGNIFICANT CHANGE UP (ref 11.5–15.5)
IMM GRANULOCYTES NFR BLD AUTO: 0.2 % — SIGNIFICANT CHANGE UP (ref 0–0.9)
INR BLD: 0.95 RATIO — SIGNIFICANT CHANGE UP (ref 0.85–1.18)
LIDOCAIN IGE QN: 21 U/L — SIGNIFICANT CHANGE UP (ref 13–75)
LYMPHOCYTES # BLD AUTO: 2.31 K/UL — SIGNIFICANT CHANGE UP (ref 1–3.3)
LYMPHOCYTES # BLD AUTO: 43.1 % — SIGNIFICANT CHANGE UP (ref 13–44)
MAGNESIUM SERPL-MCNC: 1.6 MG/DL — SIGNIFICANT CHANGE UP (ref 1.6–2.6)
MCHC RBC-ENTMCNC: 31.4 PG — SIGNIFICANT CHANGE UP (ref 27–34)
MCHC RBC-ENTMCNC: 35.1 GM/DL — SIGNIFICANT CHANGE UP (ref 32–36)
MCV RBC AUTO: 89.2 FL — SIGNIFICANT CHANGE UP (ref 80–100)
MONOCYTES # BLD AUTO: 0.3 K/UL — SIGNIFICANT CHANGE UP (ref 0–0.9)
MONOCYTES NFR BLD AUTO: 5.6 % — SIGNIFICANT CHANGE UP (ref 2–14)
NEUTROPHILS # BLD AUTO: 2.68 K/UL — SIGNIFICANT CHANGE UP (ref 1.8–7.4)
NEUTROPHILS NFR BLD AUTO: 50 % — SIGNIFICANT CHANGE UP (ref 43–77)
NT-PROBNP SERPL-SCNC: 19 PG/ML — SIGNIFICANT CHANGE UP (ref 0–125)
PLATELET # BLD AUTO: 218 K/UL — SIGNIFICANT CHANGE UP (ref 150–400)
POTASSIUM SERPL-MCNC: 2.9 MMOL/L — CRITICAL LOW (ref 3.5–5.3)
POTASSIUM SERPL-SCNC: 2.9 MMOL/L — CRITICAL LOW (ref 3.5–5.3)
PROT SERPL-MCNC: 7.2 GM/DL — SIGNIFICANT CHANGE UP (ref 6–8.3)
PROTHROM AB SERPL-ACNC: 10.7 SEC — SIGNIFICANT CHANGE UP (ref 9.5–13)
RBC # BLD: 4.37 M/UL — SIGNIFICANT CHANGE UP (ref 3.8–5.2)
RBC # FLD: 13.3 % — SIGNIFICANT CHANGE UP (ref 10.3–14.5)
SODIUM SERPL-SCNC: 131 MMOL/L — LOW (ref 135–145)
TROPONIN I, HIGH SENSITIVITY RESULT: 6.27 NG/L — SIGNIFICANT CHANGE UP
WBC # BLD: 5.36 K/UL — SIGNIFICANT CHANGE UP (ref 3.8–10.5)
WBC # FLD AUTO: 5.36 K/UL — SIGNIFICANT CHANGE UP (ref 3.8–10.5)

## 2024-02-11 PROCEDURE — 85025 COMPLETE CBC W/AUTO DIFF WBC: CPT

## 2024-02-11 PROCEDURE — 71045 X-RAY EXAM CHEST 1 VIEW: CPT

## 2024-02-11 PROCEDURE — 99285 EMERGENCY DEPT VISIT HI MDM: CPT

## 2024-02-11 PROCEDURE — 99285 EMERGENCY DEPT VISIT HI MDM: CPT | Mod: 25

## 2024-02-11 PROCEDURE — 71045 X-RAY EXAM CHEST 1 VIEW: CPT | Mod: 26

## 2024-02-11 PROCEDURE — 84484 ASSAY OF TROPONIN QUANT: CPT

## 2024-02-11 PROCEDURE — 85610 PROTHROMBIN TIME: CPT

## 2024-02-11 PROCEDURE — 83880 ASSAY OF NATRIURETIC PEPTIDE: CPT

## 2024-02-11 PROCEDURE — 83690 ASSAY OF LIPASE: CPT

## 2024-02-11 PROCEDURE — 36415 COLL VENOUS BLD VENIPUNCTURE: CPT

## 2024-02-11 PROCEDURE — 85730 THROMBOPLASTIN TIME PARTIAL: CPT

## 2024-02-11 PROCEDURE — 83735 ASSAY OF MAGNESIUM: CPT

## 2024-02-11 PROCEDURE — 93005 ELECTROCARDIOGRAM TRACING: CPT

## 2024-02-11 PROCEDURE — 93010 ELECTROCARDIOGRAM REPORT: CPT

## 2024-02-11 PROCEDURE — 80053 COMPREHEN METABOLIC PANEL: CPT

## 2024-02-11 RX ORDER — POTASSIUM CHLORIDE 20 MEQ
40 PACKET (EA) ORAL ONCE
Refills: 0 | Status: COMPLETED | OUTPATIENT
Start: 2024-02-11 | End: 2024-02-11

## 2024-02-11 RX ADMIN — Medication 40 MILLIEQUIVALENT(S): at 22:39

## 2024-02-11 NOTE — ED PROVIDER NOTE - CPE EDP CARDIAC NORM
Medications:  1. TODAY: -We are going to give you some bumex through an IV                     -SKIP your afternoon dose of bumex (just 6mg, two times today)                     -Take an EXTRA 20mEq of potassium tonight (60mEq, three times today)    Instructions:  1. Please get your labs check again on Friday. We want to check your white blood cell count  2. Your new VAD Coordinator, Maira, will be in contact with you!    Follow-up: (make these appointments before you leave)  1. Please schedule appointments every other week, October 11th thru the end of November. You can see either Dr. Celestin or one of the VAD APPs. You should have labs with these appointments.     Page the VAD Coordinator on call if you gain more than 3 lb in a day or 5 in a week. Please also page if you feel unwell or have alarms.     Great to see you in clinic today. To Page the VAD Coordinator on call, dial [phone] option #4 and ask to speak to the VAD coordinator on call.        normal...

## 2024-02-11 NOTE — ED PROVIDER NOTE - SKIN, MLM
Discussed lid hygiene, warm compress and eyelid wash. Skin normal color for race, warm, dry and intact. No evidence of rash.

## 2024-02-11 NOTE — ED ADULT TRIAGE NOTE - GLASGOW COMA SCALE: EYE OPENING, MLM
SUBJECTIVE:     Chief Complaint & History of Present Illness:  Estefani Fam is a Established 73 y.o. year old female patient presenting today for follow up for her left distal radius and ulna styloid fracture.  She was last seen by me on 9/1/22.  She has been treated non-operative per Dr. Turner's recommendation.        Currently she has no pain.  She denies falls or injuries since her last visit.  States she has remained complaint with her weight bearing restrictions.  The therapy.  She reports that she is using Prolia injections twice a year.  She denies any hand or finger numbness.  She is RHD.  She is a chronic COPD and is on supplemental oxygen 3 L the is nasal cannula and followed by Dr. Norman in pulmonology.  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      Past Medical History:   Diagnosis Date    Cataract     COPD (chronic obstructive pulmonary disease)     COVID-19     History of COVID-19 1/17/2021    Still with mild dyspnea. Encouraged return to pulmonary rehab Recommend scheduling vaccination when appointment is available.     History of retinal hemorrhage     Lobar pneumonia 11/14/2021    Recurrent in RLL, no endobronchial lesion Needs speech evaluation and MBSS for recurrent aspiration in setting of dysphagia  Will need pulmonary re[facility] at [facility].    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       Past Surgical History:   Procedure Laterality Date    BRONCHOSCOPY WITH FLUOROSCOPY N/A 10/28/2019    Procedure: BRONCHOSCOPY, WITH FLUOROSCOPY;  Surgeon: Brooklynn Ruiz MD;  Location: [facility] OR [address] [place] [address];  Service: Endoscopy;  Laterality: N/A;    HAND SURGERY         Family History   Problem Relation Age of Onset    Macular degeneration Mother     Colon cancer Mother     Stroke Father      Colon cancer Father     Amblyopia Neg Hx     Blindness Neg Hx     Cataracts Neg Hx     Diabetes Neg Hx     Glaucoma Neg Hx     Hypertension Neg Hx     Retinal detachment Neg Hx     Strabismus Neg Hx     Thyroid disease Neg Hx        Review of patient's allergies indicates:   Allergen Reactions    Albuterol     Ipratropium analogues      Difficulty breathing         Current Outpatient Medications:     acetylcysteine 600 mg Cap, Take 1 capsule (600 mg total) by mouth 2 (two) times daily., Disp: 90 capsule, Rfl: 3    ascorbic acid, vitamin C, (VITAMIN C) 500 MG tablet, Take 500 mg by mouth 2 (two) times daily. , Disp: , Rfl:     azelastine (ASTELIN) 137 mcg (0.1 %) nasal spray, USE 1 SPRAY IN EACH NOSTRIL TWICE DAILY OR AS DIRECTED, Disp: 30 mL, Rfl: 3    azelastine (ASTELIN) 137 mcg (0.1 %) nasal spray, USE 1 SPRAY IN EACH NOSTRIL TWICE DAILY OR AS DIRECTED, Disp: 30 mL, Rfl: 3    azithromycin (Z-JERSEY) 250 MG tablet, Take 1 tablet (250 mg total) by mouth every Mon, Wed, Fri., Disp: 36 tablet, Rfl: 3    BREO ELLIPTA 200-25 mcg/dose DsDv diskus inhaler, INHALE 1 PUFF INTO THE LUNGS DAILY, Disp: 60 each, Rfl: 11    calcium carbonate (OS-STEPHANIE) 600 mg calcium (1,500 mg) Tab, Take 1 tablet (600 mg total) by mouth once daily. Take Levofloxacin antibiotic at least 2 hours before calcium., Disp: , Rfl: 0    cephALEXin (KEFLEX) 500 MG capsule, Take 1 capsule (500 mg total) by mouth 4 (four) times daily. for 7 days, Disp: 28 capsule, Rfl: 0    citalopram (CELEXA) 10 MG tablet, Take 1.5 tablets (15 mg total) by mouth once daily., Disp: 90 tablet, Rfl: 3    fluticasone propionate (FLONASE) 50 mcg/actuation nasal spray, INSTILL 1 SPRAY IN EACH NOSTRIL EVERY DAY, Disp: 16 g, Rfl: 0    levalbuterol (XOPENEX) 0.63 mg/3 mL nebulizer solution, USE 1 VIAL IN NEBULIZER EVERY 8 HOURS AS NEEDED FOR WHEEZE, Disp: 750 mL, Rfl: 2    MULTIVIT-IRON-MIN-FOLIC ACID 3,500-18-0.4 UNIT-MG-MG ORAL CHEW, Take 1 tablet by mouth once daily. Take  "Levofloxacin antibiotic at least 2 hours before multivitamin., Disp: , Rfl: 0    pantoprazole (PROTONIX) 40 MG tablet, TAKE 1 TABLET(40 MG) BY MOUTH EVERY DAY, Disp: 90 tablet, Rfl: 0    predniSONE (DELTASONE) 10 MG tablet, 4 tabs (40mg) daily for five days, then 2 tabs (20mg) daily for five days, then 1 tab (10mg) daily for five days., Disp: 35 tablet, Rfl: 0    pulse oximeter (PULSE OXIMETER) device, by Apply Externally route 2 (two) times a day. Use twice daily at 8 AM and 3 PM and record the value in MyChart as directed., Disp: 1 each, Rfl: 0    sodium chloride (OCEAN) 0.65 % nasal spray, 1 spray by Nasal route 2 (two) times daily., Disp: 88 mL, Rfl: 12    umeclidinium (INCRUSE ELLIPTA) 62.5 mcg/actuation inhalation capsule, Inhale 62.5 mcg into the lungs once daily., Disp: 90 each, Rfl: 3    Current Facility-Administered Medications:     albuterol inhaler 2 puff, 2 puff, Inhalation, Q20 Min PRN, Miles Jackson MD    Review of Systems:  ROS:  Constitutional: no fever or chills  Eyes: no visual changes  ENT: no nasal congestion or sore throat  Respiratory: no cough or shortness of breath  Cardiovascular: no chest pain or palpitations  Gastrointestinal: no nausea or vomiting, tolerating diet  Genitourinary: no hematuria or dysuria  Integument/Breast: no rash or pruritis  Hematologic/Lymphatic: no easy bruising or lymphadenopathy  Musculoskeletal:  Left wrist fracture  Neurological: no seizures or tremors  Behavioral/Psych: no auditory or visual hallucinations  Endocrine: no heat or cold intolerance        OBJECTIVE:     PHYSICAL EXAM:  There were no vitals taken for this visit.  Estimated body mass index is 19.78 kg/m² as calculated from the following:    Height as of 9/1/22: 5' 8" (1.727 m).    Weight as of 10/24/22: 59 kg (130 lb 1.1 oz).   General: Pleasant, cooperative, NAD   HEENT: NCAT, sclera nonicteric   Lungs: Respirations are equal and unlabored.   Abdomen: Soft and non-tender.  CV: 2+ bilateral " upper and lower extremity pulses.   Skin: Intact throughout LE with no rashes, erythema, or lesions  Extremities: No LE edema, NVI lower extremities    left wrist   History of injury: fall, date: 5/16/22  Pain: none  Neurological complaints: none  Vascular complaints: none  Previous treatments:  Reduction in the ED and placement of a sugar-tong splint by orthopedic resident.  Condition of skin:  intact  Hand Exam: normal exam, no swelling, tenderness, instability; ligaments intact, full ROM both hands, wrists, and finger joints  ROM:  Full range of motion of the fingers.    Wrist Exam: ROM is 40 degrees in all planes.    RADIOGRAPHS:  X-ray of the left wrist obtained and personally reviewed by me, findings show a distal radius fracture that appears stable and unchanged when compared to prior x-ray.  There is callus formation.  No new fractures seen.  Her chip fracture in the ulna styloid is unchanged.        ASSESSMENT/PLAN:       ICD-10-CM ICD-9-CM   1. Closed fracture of distal end of left radius with routine healing, unspecified fracture morphology, subsequent encounter  S52.502D V54.12       -Estefani Fam presents to clinic today with c/c left wrist fracture secondary to trip and fall on 5/16/22.  -X-ray as above.    -I will allow her to come out of the Velcro wrist splint.  -I will allow her to advance her weight bearing at 2 lbs/month as she tolerates.  -HEP  -She may continue to use over-the-counter analgesics for pain control.  -She will follow-up PRN.    Future Appointments   Date Time Provider Department Center   3/13/2023  2:00 PM Tammy Vazquez MD [facility] MED Luc mary alice   4/25/2023  2:45 PM INJECTION [facility] AMB INF [facility]                (E4) spontaneous

## 2024-02-11 NOTE — ED ADULT NURSE NOTE - OBJECTIVE STATEMENT
64 y/o female presents to ED c/o chest pressure. Pt states that she feels pressure on her L side of chest for x1 day. Pt denies pain, SOB, N/V/D. Pt reports slight lightheadedness. PMHx autoimmune disease. No known cardiac hx. No acute distress noted, pt on tele monitor.

## 2024-02-11 NOTE — ED PROVIDER NOTE - CLINICAL SUMMARY MEDICAL DECISION MAKING FREE TEXT BOX
Adult female here w/ 1 week of chest pressure in the setting of stress. VSS, EKG WNL. History on physical is not consistent w/ PE or dissection. Currently CP free, feels globus sensation. CXR negative, 12 Lead w/o ischemic changes. Given low heart score, anticipate d/c.

## 2024-02-11 NOTE — ED ADULT TRIAGE NOTE - CHIEF COMPLAINT QUOTE
Pt presents to er with complaints of chest pressure radiating to her throat for past week, states she has a history of HTN, is a pt of  and was instructed to come in for further evaluation at this time, pt is not on ac, did not take asa prior to arrival.

## 2024-02-11 NOTE — ED PROVIDER NOTE - PATIENT PORTAL LINK FT
You can access the FollowMyHealth Patient Portal offered by [facility] by registering at the following website: [email or web address]. By joining [employer]’s FollowMyHealth portal, you will also be able to view your health information using other applications (apps) compatible with our system.

## 2024-02-11 NOTE — ED PROVIDER NOTE - OBJECTIVE STATEMENT
Pt is a 63y female w/ a PMH of HTN on losartan/amlodipine, RA, and anxiety presents to the ED c/o constant chest pressure radiating to her throat for the past week. States she has been dealing with personal matters, heightening her stress. Pt monitors her heart w/ an application, noticed her resting HR has increased over the last several days. Cardiologist Елена instructed patient to present to the ED for further evaluation. Did not take ASA PTA. Denies SOB, n/v, near-syncope, or recent illness. Denies smoker, h/o PE/DVT, or FMHx of cardiac issues. NKA.

## 2024-02-11 NOTE — ED PROVIDER NOTE - NSFOLLOWUPINSTRUCTIONS_ED_ALL_ED_FT
Seek immediate medical assistance for any new or worsening symptoms. If you have issues obtaining follow up, please call: [phone] or [phone]  to obtain a doctor or specialist who takes your insurance in your area.     [facility]  General Internal Medicine  [address] [place] [address]  Phone: [phone]  Fax:     [facility]  Internal Medicine  [address] [place] [address]  Phone: [phone]  Fax: [phone]    [facility]  Cardiology  [address] [place] [address]  Phone: [phone]    Chest Pain  WHAT YOU NEED TO KNOW:  Chest pain can be caused by a range of conditions, from not serious to life-threatening. Chest pain can be a symptom of a digestive problem, such as acid reflux or a stomach ulcer. An anxiety attack or a strong emotion, such as anger, can also cause chest pain. Infection, inflammation, or a fracture in the bones or cartilage in your chest can cause pain or discomfort. Sometimes chest pain or pressure is caused by poor blood flow to your heart (angina). Chest pain may also be caused by life-threatening conditions such as a heart attack or blood clot in your lungs.   DISCHARGE INSTRUCTIONS:  Call 911 if:   •You have any of the following signs of a heart attack: ?Squeezing, pressure, or pain in your chest  ?You may also have any of the following: ?Discomfort or pain in your back, neck, jaw, stomach, or arm  ?Shortness of breath  ?Nausea or vomiting  ?Lightheadedness or a sudden cold sweat  Seek care immediately if:   •You have chest discomfort that gets worse, even with medicine.  •You cough or vomit blood.   •Your bowel movements are black or bloody.   •You cannot stop vomiting, or it hurts to swallow.   Contact your healthcare provider if:   •You have questions or concerns about your condition or care.  Medicines:   •Medicines may be given to treat the cause of your chest pain. Examples include pain medicine, anxiety medicine, or medicines to increase blood flow to your heart.   •Do not take certain medicines without asking your healthcare provider first. These include NSAIDs, herbal or vitamin supplements, or hormones (estrogen or progestin).   •Take your medicine as directed. Contact your healthcare provider if you think your medicine is not helping or if you have side effects. Tell him or her if you are allergic to any medicine. Keep a list of the medicines, vitamins, and herbs you take. Include the amounts, and when and why you take them. Bring the list or the pill bottles to follow-up visits. Carry your medicine list with you in case of an emergency.  Follow up with your healthcare provider within 72 hours, or as directed: You may need to return for more tests to find the cause of your chest pain. You may be referred to a specialist, such as a cardiologist or gastroenterologist. Write down your questions so you remember to ask them during your visits.   Healthy living tips: The following are general healthy guidelines. If your chest pain is caused by a heart problem, your healthcare provider will give you specific guidelines to follow.  •Do not smoke. Nicotine and other chemicals in cigarettes and cigars can cause lung and heart damage. Ask your healthcare provider for information if you currently smoke and need help to quit. E-cigarettes or smokeless tobacco still contain nicotine. Talk to your healthcare provider before you use these products.   •Eat a variety of healthy, low-fat, low-salt foods. Healthy foods include fruits, vegetables, whole-grain breads, low-fat dairy products, beans, lean meats, and fish. Ask for more information about a heart healthy diet.  •Drink plenty of water every day. Your body is made of mostly water. Water helps your body to control temperature and blood pressure. Ask your healthcare provider how much water you should drink every day.  •Ask about activity. Your healthcare provider will tell you which activities to limit or avoid. Ask when you can drive, return to work, and have sex. Ask about the best exercise plan for you.  •Maintain a healthy weight. Ask your healthcare provider how much you should weigh. Ask him or her to help you create a weight loss plan if you are overweight.   If you have a stent:   •Carry your stent card with you at all times.   •Let all healthcare providers know that you have a stent.     [facility]  General Internal Medicine  [address] [place] [address]  Phone: [phone]  Fax:     [facility]  Internal Medicine  [address] [place] [address]  Phone: [phone]  Fax: [phone]    [facility]  Cardiology  [address] [place] [address]  Phone: [phone]    Chest Pain  WHAT YOU NEED TO KNOW:  Chest pain can be caused by a range of conditions, from not serious to life-threatening. Chest pain can be a symptom of a digestive problem, such as acid reflux or a stomach ulcer. An anxiety attack or a strong emotion, such as anger, can also cause chest pain. Infection, inflammation, or a fracture in the bones or cartilage in your chest can cause pain or discomfort. Sometimes chest pain or pressure is caused by poor blood flow to your heart (angina). Chest pain may also be caused by life-threatening conditions such as a heart attack or blood clot in your lungs.   DISCHARGE INSTRUCTIONS:  Call 911 if:   •You have any of the following signs of a heart attack: ?Squeezing, pressure, or pain in your chest  ?You may also have any of the following: ?Discomfort or pain in your back, neck, jaw, stomach, or arm  ?Shortness of breath  ?Nausea or vomiting  ?Lightheadedness or a sudden cold sweat  Seek care immediately if:   •You have chest discomfort that gets worse, even with medicine.  •You cough or vomit blood.   •Your bowel movements are black or bloody.   •You cannot stop vomiting, or it hurts to swallow.   Contact your healthcare provider if:   •You have questions or concerns about your condition or care.  Medicines:   •Medicines may be given to treat the cause of your chest pain. Examples include pain medicine, anxiety medicine, or medicines to increase blood flow to your heart.   •Do not take certain medicines without asking your healthcare provider first. These include NSAIDs, herbal or vitamin supplements, or hormones (estrogen or progestin).   •Take your medicine as directed. Contact your healthcare provider if you think your medicine is not helping or if you have side effects. Tell him or her if you are allergic to any medicine. Keep a list of the medicines, vitamins, and herbs you take. Include the amounts, and when and why you take them. Bring the list or the pill bottles to follow-up visits. Carry your medicine list with you in case of an emergency.  Follow up with your healthcare provider within 72 hours, or as directed: You may need to return for more tests to find the cause of your chest pain. You may be referred to a specialist, such as a cardiologist or gastroenterologist. Write down your questions so you remember to ask them during your visits.   Healthy living tips: The following are general healthy guidelines. If your chest pain is caused by a heart problem, your healthcare provider will give you specific guidelines to follow.  •Do not smoke. Nicotine and other chemicals in cigarettes and cigars can cause lung and heart damage. Ask your healthcare provider for information if you currently smoke and need help to quit. E-cigarettes or smokeless tobacco still contain nicotine. Talk to your healthcare provider before you use these products.   •Eat a variety of healthy, low-fat, low-salt foods. Healthy foods include fruits, vegetables, whole-grain breads, low-fat dairy products, beans, lean meats, and fish. Ask for more information about a heart healthy diet.  •Drink plenty of water every day. Your body is made of mostly water. Water helps your body to control temperature and blood pressure. Ask your healthcare provider how much water you should drink every day.  •Ask about activity. Your healthcare provider will tell you which activities to limit or avoid. Ask when you can drive, return to work, and have sex. Ask about the best exercise plan for you.  •Maintain a healthy weight. Ask your healthcare provider how much you should weigh. Ask him or her to help you create a weight loss plan if you are overweight.   If you have a stent:   •Carry your stent card with you at all times.   •Let all healthcare providers know that you have a stent.     Dolor de pecho    LO QUE NECESITA SABER:    El dolor en el pecho puede ser provocado por chano variedad de condiciones, algunas no tan serias y otras que son de peligro mortal. El dolor de pecho también puede ser un síntoma de un problema digestivo, aicha la acidez o chano úlcera estomacal. Un ataque de ansiedad o chano emoción norma, aicha el enojo, también pueden provocar dolor de pecho. Chano infección, inflamación o fractura en un hueso o cartílago en el pecho podría provocar dolor o molestia. En ocasiones el dolor torácico o la presión en el pecho pueden ser el resultado de porfirio circulación de la eulogio al corazón (angina). El dolor de pecho también puede ser causado por trastornos potencialmente mortales aicha un ataque al corazón o un coágulo de eulogio en los pulmones.    INSTRUCCIONES SOBRE EL [facility]ITAL[facility]:    Llame al número local de emergencias (911 en los [place]) o pídale a alguien que llame si:    Tiene alguno de los siguientes signos de un ataque cardíaco:  Estrujamiento, presión o tensión en obregon pecho    Usted también podría presentar alguno de los siguientes:  Malestar o dolor en obregon espalda, brayan, mandíbula, abdomen, o brazo    Falta de aliento    Náuseas o vómitos    Desvanecimiento o sudor frío repentino    Busque atención médica de inmediato si:    La inflamación en obregon pecho empeora, aun con tratamiento.    Usted tose o vomita eulogio.    Nissa heces son negras o tienen eulogio.    Usted no puede dejar de vomitar o le duele al tragar.  Llame a obregon médico si:    Usted tiene preguntas o inquietudes acerca de obregon condición o cuidado.    Medicamentos:    Los medicamentospueden administrarse para tratar la causa del dolor de pecho. Por ejemplo, analgésicos, medicamentos para la ansiedad o medicamentos para aumentar el flujo de eulogio al corazón.    No tome ciertos medicamentos sin antes preguntarle a obregon médico.Estos incluyen RICARDO, suplementos vitamínicos o a base de hierbas u hormonas (estrógeno o progestágeno).    [place] nissa medicamentos aicha se le haya indicado.Consulte con obregon médico si usted alex que obregon medicamento no le está ayudando o si presenta efectos secundarios. Infórmele si es alérgico a cualquier medicamento. Mantenga chano lista actualizada de los medicamentos, las vitaminas y los productos herbales que misha. Incluya los siguientes datos de los medicamentos: cantidad, frecuencia y motivo de administración. Traiga con usted la lista o los envases de las píldoras a nissa citas de seguimiento. Lleve la lista de los medicamentos con usted en samantha de chano emergencia.  Consejos para vivir saludable:Los siguientes son consejos generales de justina. Si se conoce la causa de obregon dolor de pecho, obregon médico le dará pautas específicas a seguir.    No fume.La nicotina y otros químicos contenidos en los cigarrillos y cigarros pueden causar daño a nissa pulmones y el corazón. Pida información a obregon médico si usted actualmente fuma y necesita ayuda para dejar de fumar. Los cigarrillos electrónicos o el tabaco sin humo igualmente contienen nicotina. Consulte con obregon médico antes de utilizar estos productos.    Elija chano variedad de alimentos saludables tan a menudo aicha sea posible.Incluya frutas y verduras frescas, congeladas o enlatadas. También incluya productos lácteos bajos en grasa, pescado, rivka (sin piel) y mina magras. Obregon médico o dietista pueden ayudarlo a crear planes de alimentos. Es posible que tenga que evitar ciertos alimentos o bebidas si el dolor es causado por un problema de digestión.  Alimentos saludables      Reduzca el consumo de sodio (sal).Limite el consumo de alimentos altos en sodio, aicha comidas enlatadas, bocadillos salados y embutidos. Si añade nir cuando cocina la comida, no añada más en la pena. Elija alimentos enlatados bajos en sodio tanto aicha sea posible.        Consuma abundante agua al día.El agua ayuda al cuerpo a controlar la temperatura y la presión arterial. Pregunte a obregon médico cuál es la cantidad de agua que debería consumir cada día.    Pregunte acerca de la actividad.Obregon médico le dirá cuáles actividades limitar y cuáles evitar. Pregunte cuándo puede manejar, regresar a obregon trabajo y tener relaciones sexuales. Pida más información acerca de un plan de ejercicio adecuado para usted.    Mantenga un peso saludable.Pregúntele a obregon médico cuál es el peso ideal para usted. Pídale que lo ayude a crear un plan seguro para bajar de peso si tiene sobrepeso.    Pregunte sobre las vacunas que pudiera necesitar.Vacúnese contra la influenza (gripe) todos los años tan pronto aicha se recomiende, normalmente en septiembre u octubre. Usted también podría necesitar la vacuna antineumocócica para evitar la neumonía. La vacuna se administra generalmente cada 5 años, a partir de los 65 años. Obregon médico puede indicarle si debe recibir otras vacunas, y cuándo aplicárselas.  Programe chano cornell con obregon médico dentro de 72 horas o aicha se le indique:Es posible que deba regresar para hacerse más pruebas para encontrar la causa del dolor de pecho. Es probable que lo refieran a un especialista, aicha un cardiólogo o un gastroenterólogo. Anote nissa preguntas para que se acuerde de hacerlas luanne nissa visitas.

## 2024-02-11 NOTE — ED ADULT NURSE NOTE - NS ED NURSE LEVEL OF CONSCIOUSNESS ORIENTATION
visit doing worse overall. She actually was admitted to the hospital for altered mental status secondary to a UTI and severe anemia. She feels like since coming to the hospital she has noticed severe low back pain and feels like she has lost a lot of overall function with her ability to stand upright because of her low back pain hurting. She needs take her Lyrica, Mobic, and breakthrough Norco as prescribed without any side effects. She is wondering what else can be done to help out with her pain. She is being enrolled in physical therapy to help out with some of her low back pain. History of Interventions:   Surgery: Previous discectomy in 30s, left femur IMN, left TKA  Injections: Previous lumabr RFA ~8-10 years ago by Dr. Cesar Beltre  Diagnostic lumbar MBBs  Confirmatory lumbar MBBs  Lumbar RFA (10/19/2021 and 12/7/21)-significant relief (>85%) x 10 months    Current Treatment Medications:   Meloxicam 15 mg daily  Heating pad PRN  Mishel 150 mg BID  Cymbalta 60 mg daily    Historical Treatment Medications:   Tramadol-ineffective  Norco-stopped (constipation)    Imaging:  MRI L-spine  (6/11/22)    PROCEDURE: MRI LUMBAR SPINE WO CONTRAST       CLINICAL INFORMATION: Fall, sequela. COMPARISON: Plain radiographs dated 18 May 2021. .       TECHNIQUE: Sagittal and axial T1 and T2-weighted images were obtained through the lumbar spine. FINDINGS:           There is a levoscoliosis. There is degenerative change in the vertebral body endplates adjacent to U03-97, T12-L1, L1-2, L2-3, L3-4 and L4-5 disc spaces. .  There is no bone marrow edema. There is no acute compression fracture. No pars defects are    noted. .       The distal spinal cord, conus medullaris and cauda equina are normal.        There are no gross abnormalities in the visualized aspects of the distal thoracic spine. On the axial images, at T12-L1, there is no disc herniation or canal stenosis.  There is moderate right and mild-to-moderate left-sided foraminal stenosis. At L1-L2, there is a 1.9 mm bulging disc and facet hypertrophy. This results in moderate right and mild-to-moderate left-sided foraminal stenosis with no canal stenosis. At L2-L3, there is a 3.5 mm bulging disc and facet hypertrophy. This results in mild canal, moderate left and mild-to-moderate right-sided foraminal stenosis. At L3-L4, there is a 2.4 mm bulging disc and facet hypertrophy. This results in mild canal and moderate bilateral foraminal stenosis. At L4-L5, there are some 1.6 mm bulging disc and facet hypertrophy. This causes mild canal and moderate bilateral foraminal stenosis. At L5-S1, there is no disc herniation or canal stenosis. There is mild-to-moderate bilateral foraminal stenosis. There is degenerative change involving the sacroiliac joints bilaterally. There is a perineural cyst on the left side at S2. There is atrophy involving the paraspinal muscles in the lower lumbar spine. Impression       1. Levoscoliosis. No evidence for an acute compression fracture. 2. Degenerative changes resulting in mild canal and moderate bilateral foraminal stenosis at L3-4 and L4-5.   3. There is moderate right and mild-to-moderate left-sided foraminal stenosis with no canal stenosis at T12-L1 and L1-2.   4. There is mild canal, moderate left and mild right-sided foraminal stenosis at L2-3.   5. There is mild-to-moderate bilateral from stenosis but no canal stenosis at L5-S1.   6. There is degenerative change involving the sacroiliac joints bilaterally. 7. There is a perineural cyst on the left side at S2.   8. There is atrophy in the paraspinal muscles.              Past Medical History:   Diagnosis Date    Abnormal EKG     inferior infarct on EKG - last stress test 2004    Anemia     mild - Hg 11.8 preop 1/2014    Back pain     pain clinic - s/p injections     Blood circulation, collateral Diverticulitis     Dr. Lena Jose     GERD (gastroesophageal reflux disease)     Diverticulitis     Hiatal hernia     Hypertension     BAKI    Hypothyroidism     Osteoarthritis        Past Surgical History:   Procedure Laterality Date    APPENDECTOMY  1958    BACK SURGERY      CARPAL TUNNEL RELEASE Bilateral 2013    w/cubital tunnel    COLON SURGERY  07/29/2016    Procedure: ATTEMPTED DAVINCI XI ROBOTIC ASSISTED SIGMOID COLON RESECTION, ROBOTIC ASSISTED MOBILIZATION OF RECTAL SIGMOID TO SPLENIC FLEXURE, CONVERTED TO OPEN LEFT HEMICOLECTOMY WITH COLOPROCTOCTOMY, SPLENORRHAPHY, RIGID SIGMOIDOSCOPY, LASER EVALUATION OF VASCULARITY WITH ICG; Surgeon: Jeanetta Hammans, MD; Location: [facility]; Service: General    COLONOSCOPY  2012    CYST REMOVAL  2010    EYE SURGERY  1998, 1999    Cataract    FEMUR FRACTURE SURGERY Left 12/21/2020    LEFT FEMUR OPEN REDUCTION INTERNAL FIXATION performed by Avery Quinonez MD at [facility] [address] [place] [address] 2001    [address] [place] [address] N/A 03/09/2021    ROBOTIC EXTENSIVE LYSIS OF ADHESIONS, ROBOTIC REDUCTION OF HIATAL HERNIA WITH GASTROPEXY performed by Susan Morris MD at [facility] ([address] [place] [address])  1979    w/bladder suspension    JOINT REPLACEMENT  2002, 2008, 2009    rt hip(2002), lt knee(2009), lt hip(2008) Shoulder (2009)    OTHER SURGICAL HISTORY  04/21/2021    [facility] CNP in the office    [address] Bilateral 7/13/2021    medial branch blocks at bilateral L4/L5 and L5/S1 performed by Luke Lee DO at [facility] Bilateral 8/24/2021    confirmatory  medial branch blocks at bilateral L4/L5 and L5/S1 performed by Luke Lee DO at [facility] Right 10/19/2021    thermal radiofrequency at BILATERAL medial branches L4/L5 and L5/S1 was chosen.   We will start with the right side first performed by Erin Gurrola DO at [facility] Left 2021    thermal radiofrequency at BILATERAL medial branches L4/L5 and L5/S1 was chosen.  performed by Erin Gurrola DO at [facility] N/A 2022    lumbar epidural steroid injection Lumbar 5 Sacral 1 performed by Erin Gurrola DO at [facility]  [address] [facility] Left 09/15/2017    COLONOSCOPY performed by Elisabet Remy MD at [place] [address]    discectomy    [facility] ENDOSCOPY  2012    UPPER GASTROINTESTINAL ENDOSCOPY Left 2022    EGD BIOPSY performed by Marialuisa Harris MD at [facility]    UPPER GASTROINTESTINAL ENDOSCOPY N/A 2022    EGD BIOPSY performed by Christiano aHji MD at [facility]       Family History   Problem Relation Age of Onset    Arthritis Mother     Hearing Loss Mother     High Cholesterol Mother     Hearing Loss Father     Heart Disease Father 76        CABG    Stroke Father     Arthritis Sister     Depression Sister     Diabetes Sister     Arthritis Brother     Depression Brother     Cancer Maternal Aunt     Early Death Maternal Aunt     Diabetes Maternal Grandmother     Heart Disease Paternal Grandmother        Social History     Socioeconomic History    Marital status:      Spouse name: Emerson Huerta    Number of children: Not on file    Years of education: 15    Highest education level: Associate degree: academic program   Occupational History    Not on file   Tobacco Use    Smoking status: Former     Packs/day: 2.00     Years: 11.00     Pack years: 22.00     Types: Cigarettes     Start date: 1957     Quit date: 1967     Years since quittin.9    Smokeless tobacco: Never   Vaping Use    Vaping Use: Never used   Substance and Sexual Activity    Alcohol use: Yes     Comment: wine with dinner    Drug use: No    Sexual activity: Not on file   Other Topics Concern    Not on file   Social History Narrative    Referral to AAA placed    Referral placed to [facility]    No barriers with medication affordability    No barriers with transportation    Discussed support from insurance company     Social Determinants of Health     Financial Resource Strain: Low Risk     Difficulty of Paying Living Expenses: Not hard at all   Food Insecurity: No Food Insecurity    Worried About Running Out of Food in the Last Year: Never true    Ran Out of Food in the Last Year: Never true   Transportation Needs: Not on file   Physical Activity: Inactive    Days of Exercise per Week: 0 days    Minutes of Exercise per Session: 0 min   Stress: Not on file   Social Connections: Not on file   Intimate Partner Violence: Not on file   Housing Stability: Not on file       Medications & Allergies:   Current Outpatient Medications   Medication Instructions    calcium-vitamin D (OSCAL-500) 500-200 MG-UNIT per tablet 2 tablets, Oral, DAILY    Cholecalciferol (VITAMIN D3) 50 MCG (2000 UT) CAPS Oral    docusate sodium (COLACE) 100 mg, Oral, 2 TIMES DAILY    enalapril (VASOTEC) 5 mg, Oral, DAILY    ferrous sulfate (IRON 325) 325 mg, Oral, 2 TIMES DAILY    FLUoxetine (PROZAC) 20 mg, Oral, DAILY    fluticasone (FLONASE) 50 MCG/ACT nasal spray 1 spray, Nasal, PRN    HYDROcodone-acetaminophen (NORCO) 5-325 MG per tablet 1 tablet, Oral, EVERY 6 HOURS PRN    levothyroxine (SYNTHROID) 100 MCG tablet TAKE 1 TABLET DAILY    meloxicam (MOBIC) 15 mg, Oral, DAILY    metoprolol succinate (TOPROL XL) 50 MG extended release tablet TAKE 1 TABLET DAILY    Misc. Devices (WALKER) MISC 1 each, Does not apply, DAILY    Misc.  Devices MISC Mechanical lift for a Van    nitrofurantoin (macrocrystal-monohydrate) (MACROBID) 100 mg, Oral, 2 TIMES DAILY    Omega 3-6-9 Fatty Acids (OMEGA 3-6-9 COMPLEX) CAPS 1 capsule, Oral, DAILY    ondansetron (ZOFRAN) 4 mg, Oral, 3 TIMES DAILY PRN    pantoprazole (PROTONIX) 40 mg, Oral, 2 TIMES DAILY BEFORE MEALS    polyethylene glycol (GLYCOLAX) 17 g, Oral, DAILY    pregabalin (LYRICA) 150 mg, Oral, 2 TIMES DAILY    Scooter MISC Does not apply, Power scooter    sennosides-docusate sodium (SENOKOT-S) 8.6-50 MG tablet 1 tablet, Oral, DAILY PRN    sodium chloride 1 g, Oral, DAILY    therapeutic multivitamin-minerals (THERAGRAN-M) tablet 1 tablet, NIGHTLY    vitamin C (ASCORBIC ACID) 500 mg, 2 TIMES DAILY    zinc sulfate (ZINCATE) 50 mg, DAILY       Allergies   Allergen Reactions    Ampicillin     Morphine Other (See Comments)     Hallucinations     Pcn [Penicillins] Itching    Sulfa Antibiotics        Review of Systems:   Constitutional: negative for weight changes or fevers  Genitourinary: negative for bowel/bladder incontinence   Musculoskeletal: positive for low back pain  Neurological:  Positive for leg weakness and falls  Behavioral/Psych: negative for anxiety/depression   All other systems reviewed and are negative    Objective:     Vitals:    09/15/22 1320   BP: 126/72         Constitutional: Pleasant, no acute distress   Head: Normocephalic, atraumatic   Eyes: Conjunctivae normal   Neck: Supple, symmetrical   Lungs: Normal respiratory effort, non-labored breathing   Cardiovascular: Limbs warm and well perfused   Abdomen: Non-protruded   Musculoskeletal: Muscle bulk symmetric, no atrophy, no gross deformities   · Lumbar Spine: ROM severely reduced in extension. Scoliosis appreciated in thoracolumbar junction. Lumbar paraspinals tender bilaterally. Positive facet loading bilaterally. Neurological: Cranial nerves II-XII grossly intact. 1+ bilateral patellar and Achilles reflexes. Negative ankle clonus. · Gait - Severely antalgic gait. Ambulates with assistive device.    · Motor: No focal motor deficits appreciated in lower limbs  Skin: No rashes or lesions visible in low back  Psychological: Cooperative, no exaggerated pain behaviors       Assessment:    Diagnosis Orders   1. Spondylosis of lumbar spine  CHG FLUOR NEEDLE/CATH SPINE/PARASPINAL DX/THER ADDON    WA RADIOFREQUENCY NEUROTOMY LUMBAR OR SACRAL, W IMAGE GUIDANCE, SINGLE    WA RADIOFREQ NEUROTOMY LUMBAR OR SACRAL, W IMAGE GUIDE,EA ADDL LEVEL      2. Radiculopathy, lumbosacral region        3. Fall, sequela        4. Myofascial pain                Pricilla Gunter is a 81 y. o.female presenting to the pain clinic for evaluation of chronic low back pain. Her clinical history and physical examination are consistent with lumbar facet medial pain with associated myofascial pain. She responded significantly to a lumbar radiofrequency ablation back in 2021. She was hospitalized for an extended period of time and has some worsening low back pain secondary to deconditioning but also her ablations have probably worn off. I set her up for repeat lumbar radiofrequency ablations targeting bilateral L4/L5 and L5/S1 facet joints. Plan: The following treatment recommendations and plan were discussed in detail with Hayes Gunter and . Imaging:   I have reviewed patients imaging of MRI L-spine and results were discussed in detail with the patient. Analgesics:   Patient is taking Acetaminophen. Patient informed that the maximum amount of acetaminophen taken on a regular basis should only be 4000 mg per day. We will continue her meloxicam to 15 mg daily. I advised patient take this medication with food in the morning. Patient should stop any other NSAID therapies including her Aleve. Adjuvants: In light of the presence of a neuropathic component of pain, the patient should continue Lyrica and Cymbalta as prescribed. Interventions:    In presence of lumbar axial back pain and with physical exam consistent for facetal pain and significant response to diagnostic and confirmatory lumbar medial branch blocks, thermal radiofrequency at BILATERAL medial Oriented - self; Oriented - place; Oriented - time

## 2024-02-12 DIAGNOSIS — E87.6 HYPOKALEMIA: ICD-10-CM

## 2024-02-12 RX ORDER — LOSARTAN POTASSIUM 100 MG/1
100 TABLET, FILM COATED ORAL DAILY
Qty: 90 | Refills: 3 | Status: ACTIVE | COMMUNITY
Start: 2024-02-12 | End: 1900-01-01

## 2024-02-12 RX ORDER — LOSARTAN POTASSIUM AND HYDROCHLOROTHIAZIDE 25; 100 MG/1; MG/1
100-25 TABLET ORAL
Qty: 90 | Refills: 0 | Status: DISCONTINUED | COMMUNITY
Start: 2022-02-07 | End: 2024-02-12

## 2024-02-20 LAB
ANION GAP SERPL CALC-SCNC: 13 MMOL/L
BUN SERPL-MCNC: 26 MG/DL
CALCIUM SERPL-MCNC: 9.8 MG/DL
CHLORIDE SERPL-SCNC: 102 MMOL/L
CO2 SERPL-SCNC: 25 MMOL/L
CREAT SERPL-MCNC: 0.64 MG/DL
EGFR: 100 ML/MIN/1.73M2
GLUCOSE SERPL-MCNC: 78 MG/DL
MAGNESIUM SERPL-MCNC: 1.8 MG/DL
POTASSIUM SERPL-SCNC: 4.3 MMOL/L
SODIUM SERPL-SCNC: 140 MMOL/L

## 2024-03-22 ENCOUNTER — NON-APPOINTMENT (OUTPATIENT)
Age: 64
End: 2024-03-22

## 2024-03-22 ENCOUNTER — APPOINTMENT (OUTPATIENT)
Dept: CARDIOLOGY | Facility: CLINIC | Age: 64
End: 2024-03-22
Payer: COMMERCIAL

## 2024-03-22 VITALS
SYSTOLIC BLOOD PRESSURE: 144 MMHG | HEIGHT: 66 IN | HEART RATE: 84 BPM | OXYGEN SATURATION: 99 % | DIASTOLIC BLOOD PRESSURE: 88 MMHG | WEIGHT: 147 LBS | BODY MASS INDEX: 23.63 KG/M2

## 2024-03-22 DIAGNOSIS — R00.2 PALPITATIONS: ICD-10-CM

## 2024-03-22 DIAGNOSIS — M79.7 FIBROMYALGIA: ICD-10-CM

## 2024-03-22 DIAGNOSIS — I10 ESSENTIAL (PRIMARY) HYPERTENSION: ICD-10-CM

## 2024-03-22 PROCEDURE — 93000 ELECTROCARDIOGRAM COMPLETE: CPT

## 2024-03-22 PROCEDURE — 99214 OFFICE O/P EST MOD 30 MIN: CPT | Mod: 25

## 2024-03-22 NOTE — PHYSICAL EXAM
[Well Developed] : well developed [Well Nourished] : well nourished [No Acute Distress] : no acute distress [Normal Conjunctiva] : normal conjunctiva [Normal Venous Pressure] : normal venous pressure [No Carotid Bruit] : no carotid bruit [Normal S1, S2] : normal S1, S2 [No Murmur] : no murmur [No Gallop] : no gallop [Good Air Entry] : good air entry [Clear Lung Fields] : clear lung fields [Soft] : abdomen soft [No Respiratory Distress] : no respiratory distress  [Non Tender] : non-tender [Normal Bowel Sounds] : normal bowel sounds [Normal Gait] : normal gait [No Edema] : no edema [No Varicosities] : no varicosities [No Rash] : no rash [No Skin Lesions] : no skin lesions [Moves all extremities] : moves all extremities [No Focal Deficits] : no focal deficits [Normal Speech] : normal speech [Alert and Oriented] : alert and oriented [Normal memory] : normal memory

## 2024-04-14 NOTE — HISTORY OF PRESENT ILLNESS
[FreeTextEntry1] : 63 year old woman with a history of autoimmune disorders with relapsing polychondritis and fibromyalgia, hypertension for years and a family history of hypertension and hyperlipidemia.  She had an episode of significant diarrhea and was diagnosed with lymphocytic colitis.  She is maintained on Simponi for her collagen vascular disorder and her most recent inflammatory markers are controlled.  Her primary care physician noted that her lipids which had always had elevated HDLs but her LDLs are rising.  She remains concerned about her overall cardiovascular risk.  She was evaluated by neurology for memory issues.  She had an MRI with calcified meningioma in right frontal lode and neuropsych evaluation were unremarkable.  She feels like her heart is always racing and attributes it to stress.   On Superbowl Sunday, her apple watch noted that her heart rates were increased and she felt left upper chest pressure.  She was evaluated in the ER and found to have low potassium and a degree of dehydration.  She has not had any further episodes since then.

## 2024-04-14 NOTE — REVIEW OF SYSTEMS
[Weight Gain (___ Lbs)] : [unfilled] ~Ulb weight gain [Sinus Pressure] : sinus pressure [Abdominal Pain] : abdominal pain [Rash] : rash [Easy Bruising] : a tendency for easy bruising [Negative] : Psychiatric [FreeTextEntry3] : wears contacts [FreeTextEntry5] : feels improved with better BP control and heart racing resolved [FreeTextEntry7] : colitis under control [FreeTextEntry8] : post menopausal  [de-identified] : monthly facial rash and constant itching [FreeTextEntry9] : can get left hand and joint swelling and right big toe joint flares

## 2024-04-14 NOTE — DISCUSSION/SUMMARY
[FreeTextEntry1] : 63 year old woman with a history of autoimmune disorders with relapsing polychondritis and fibromyalgia, hypertension for years and a family history of hypertension and hyperlipidemia.  She had an episode of significant diarrhea and was diagnosed with lymphocytic colitis.  She is maintained on Simponi for her collagen vascular disorder and her most recent inflammatory markers are controlled.   She feels like her heart is always racing and attributes it to stress.   On Superbowl Sunday, her apple watch noted that her heart rates were increased and she felt left upper chest pressure.  She was evaluated in the ER and found to have low potassium and a degree of dehydration.  She has not had recurrent symptoms and she is staying better hydrated.  Will repeat labs to make sure potassium and magnesium are stable and have her continue to monitor her HRs on her smart watch.  She will continue her current medications and follow up annually.

## 2024-05-23 ENCOUNTER — TRANSCRIPTION ENCOUNTER (OUTPATIENT)
Age: 64
End: 2024-05-23

## 2024-05-31 LAB
ALBUMIN SERPL ELPH-MCNC: 5 G/DL
ALP BLD-CCNC: 57 U/L
ALT SERPL-CCNC: 62 U/L
ANION GAP SERPL CALC-SCNC: 16 MMOL/L
AST SERPL-CCNC: 51 U/L
BILIRUB SERPL-MCNC: 0.3 MG/DL
BUN SERPL-MCNC: 14 MG/DL
CALCIUM SERPL-MCNC: 10.5 MG/DL
CHLORIDE SERPL-SCNC: 102 MMOL/L
CHOLEST SERPL-MCNC: 253 MG/DL
CO2 SERPL-SCNC: 24 MMOL/L
CREAT SERPL-MCNC: 0.73 MG/DL
EGFR: 93 ML/MIN/1.73M2
GLUCOSE SERPL-MCNC: 83 MG/DL
HDLC SERPL-MCNC: 98 MG/DL
LDLC SERPL CALC-MCNC: 127 MG/DL
MAGNESIUM SERPL-MCNC: 2 MG/DL
NONHDLC SERPL-MCNC: 155 MG/DL
POTASSIUM SERPL-SCNC: 4.2 MMOL/L
PROT SERPL-MCNC: 7.1 G/DL
SODIUM SERPL-SCNC: 141 MMOL/L
TRIGL SERPL-MCNC: 168 MG/DL

## 2024-07-02 ENCOUNTER — APPOINTMENT (OUTPATIENT)
Dept: NEUROLOGY | Facility: CLINIC | Age: 64
End: 2024-07-02

## 2024-08-27 NOTE — ED ADULT TRIAGE NOTE - HEIGHT IN FEET
Consent: The risks of pain and injection site reactions were reviewed with the patient prior to the injection. Date Of Next Injection: 2 Weeks Lot # (Optional): [id number] Administered By (Optional): Jennifer Neal MA Ndc (200 Mg Prefilled Syringe): 2992-7937-36 Ndc (200 Mg Prefilled Pen): 8203-8650-45 Was The Medication Purchased By The Clinic?: No Render If Medication Purchased By Clinic In Visit Note?: Yes Detail Level: None Ndc (300 Mg Prefilled Syringe): 3582-2156-29 Ndc (300 Mg Prefilled Pen): 5455-0682-78 Dupixent Dosing: 300 mg Syringe Size Used (Required For Enhanced Ndc): 300 mg/2ml prefilled pen J-Code:  Expiration Date (Optional): 2026-02-28 76346 Billing Preferences: 1 5

## 2024-11-07 ENCOUNTER — APPOINTMENT (OUTPATIENT)
Dept: OBGYN | Facility: CLINIC | Age: 64
End: 2024-11-07

## 2024-11-29 ENCOUNTER — APPOINTMENT (OUTPATIENT)
Dept: CARDIOLOGY | Facility: CLINIC | Age: 64
End: 2024-11-29

## 2025-01-17 ENCOUNTER — APPOINTMENT (OUTPATIENT)
Dept: CARDIOLOGY | Facility: CLINIC | Age: 65
End: 2025-01-17

## 2025-02-11 ENCOUNTER — RX RENEWAL (OUTPATIENT)
Age: 65
End: 2025-02-11

## 2025-05-10 ENCOUNTER — RX RENEWAL (OUTPATIENT)
Age: 65
End: 2025-05-10

## 2025-05-23 ENCOUNTER — NON-APPOINTMENT (OUTPATIENT)
Age: 65
End: 2025-05-23

## 2025-05-23 ENCOUNTER — APPOINTMENT (OUTPATIENT)
Dept: CARDIOLOGY | Facility: CLINIC | Age: 65
End: 2025-05-23
Payer: COMMERCIAL

## 2025-05-23 VITALS
DIASTOLIC BLOOD PRESSURE: 88 MMHG | SYSTOLIC BLOOD PRESSURE: 136 MMHG | WEIGHT: 148.13 LBS | OXYGEN SATURATION: 99 % | BODY MASS INDEX: 24.68 KG/M2 | HEIGHT: 65 IN | HEART RATE: 64 BPM

## 2025-05-23 DIAGNOSIS — I10 ESSENTIAL (PRIMARY) HYPERTENSION: ICD-10-CM

## 2025-05-23 DIAGNOSIS — R60.0 LOCALIZED EDEMA: ICD-10-CM

## 2025-05-23 DIAGNOSIS — Z86.79 PERSONAL HISTORY OF OTHER DISEASES OF THE CIRCULATORY SYSTEM: ICD-10-CM

## 2025-05-23 DIAGNOSIS — I25.10 ATHEROSCLEROTIC HEART DISEASE OF NATIVE CORONARY ARTERY W/OUT ANGINA PECTORIS: ICD-10-CM

## 2025-05-23 DIAGNOSIS — Z87.898 PERSONAL HISTORY OF OTHER SPECIFIED CONDITIONS: ICD-10-CM

## 2025-05-23 PROCEDURE — 99214 OFFICE O/P EST MOD 30 MIN: CPT | Mod: 25

## 2025-05-23 PROCEDURE — 93000 ELECTROCARDIOGRAM COMPLETE: CPT

## 2025-05-23 RX ORDER — AMITRIPTYLINE HYDROCHLORIDE 50 MG/1
50 TABLET, FILM COATED ORAL
Qty: 90 | Refills: 0 | Status: ACTIVE | COMMUNITY
Start: 2025-05-23

## 2025-05-23 RX ORDER — SPIRONOLACTONE 25 MG/1
25 TABLET ORAL DAILY
Qty: 90 | Refills: 2 | Status: ACTIVE | COMMUNITY
Start: 2025-05-23 | End: 1900-01-01

## 2025-06-04 ENCOUNTER — NON-APPOINTMENT (OUTPATIENT)
Age: 65
End: 2025-06-04

## 2025-06-05 ENCOUNTER — APPOINTMENT (OUTPATIENT)
Dept: CARDIOLOGY | Facility: CLINIC | Age: 65
End: 2025-06-05
Payer: COMMERCIAL

## 2025-06-05 PROCEDURE — 93306 TTE W/DOPPLER COMPLETE: CPT

## 2025-08-16 DIAGNOSIS — E78.41 ELEVATED LIPOPROTEIN(A): ICD-10-CM

## 2025-08-19 ENCOUNTER — APPOINTMENT (OUTPATIENT)
Dept: OBGYN | Facility: CLINIC | Age: 65
End: 2025-08-19
Payer: COMMERCIAL

## 2025-08-19 DIAGNOSIS — Z01.419 ENCOUNTER FOR GYNECOLOGICAL EXAMINATION (GENERAL) (ROUTINE) W/OUT ABNORMAL FINDINGS: ICD-10-CM

## 2025-08-19 PROCEDURE — 99396 PREV VISIT EST AGE 40-64: CPT

## 2025-08-19 PROCEDURE — 82270 OCCULT BLOOD FECES: CPT

## 2025-08-21 LAB — CYTOLOGY CVX/VAG DOC THIN PREP: ABNORMAL

## 2025-09-09 ENCOUNTER — TRANSCRIPTION ENCOUNTER (OUTPATIENT)
Age: 65
End: 2025-09-09